# Patient Record
Sex: MALE | Race: WHITE | NOT HISPANIC OR LATINO
[De-identification: names, ages, dates, MRNs, and addresses within clinical notes are randomized per-mention and may not be internally consistent; named-entity substitution may affect disease eponyms.]

---

## 2023-02-06 PROBLEM — Z00.00 ENCOUNTER FOR PREVENTIVE HEALTH EXAMINATION: Status: ACTIVE | Noted: 2023-02-06

## 2023-02-07 ENCOUNTER — APPOINTMENT (OUTPATIENT)
Dept: ORTHOPEDIC SURGERY | Facility: CLINIC | Age: 68
End: 2023-02-07
Payer: MEDICARE

## 2023-02-07 VITALS — BODY MASS INDEX: 31.42 KG/M2 | WEIGHT: 232 LBS | HEIGHT: 72 IN

## 2023-02-07 DIAGNOSIS — Z87.442 PERSONAL HISTORY OF URINARY CALCULI: ICD-10-CM

## 2023-02-07 DIAGNOSIS — Z78.9 OTHER SPECIFIED HEALTH STATUS: ICD-10-CM

## 2023-02-07 PROCEDURE — 73080 X-RAY EXAM OF ELBOW: CPT | Mod: RT

## 2023-02-07 PROCEDURE — 99204 OFFICE O/P NEW MOD 45 MIN: CPT

## 2023-02-07 NOTE — ASSESSMENT
[FreeTextEntry1] : Pt has had right olecranon bursa aspirated 3 times and has again noted recurrence.\par Therefore, recommend surgical excision of right olecranon bursa and excision olecranon enthesophyte\par \par Risks including but not limited to infection, blood loss, tendon damage and delayed tendon disruption, muscle damage, nerve damage, stiffness, pain, arthritis, loss of function, potential for secondary surgery, loss of limb or life. The patient had the opportunity to ask questions and all questions were answered to their satisfaction.\par \par

## 2023-02-07 NOTE — HISTORY OF PRESENT ILLNESS
[de-identified] : 2/7/2023: Pt here with right elbow swelling. He has previously been diagnosed with gout s/p aspiration by Rheumatologist.\par Pt was provided allopurinol s/p 3rd aspiration.\par Pt was recommended Orthopedic consultation.\par \par PMH: Gout, htn, hyperlipidemia.\par Allergies: NKDA.

## 2023-02-07 NOTE — IMAGING
[de-identified] : Right elbow with full and pain free ROM.\par Strength is 5/5 in all planes.\par There is no ligamentous laxity noted.\par Small olecranon effusion is noted.\par There is no erythema noted.\par There is no ttp appreciated.,\par  [Right] : right elbow [FreeTextEntry1] : right olecranon enthesophyte

## 2023-02-10 ENCOUNTER — APPOINTMENT (OUTPATIENT)
Age: 68
End: 2023-02-10
Payer: MEDICARE

## 2023-02-10 ENCOUNTER — RESULT REVIEW (OUTPATIENT)
Age: 68
End: 2023-02-10

## 2023-02-10 PROCEDURE — 24341 RPR TDN/MUSC UPR A/E EACH: CPT | Mod: RT

## 2023-02-10 PROCEDURE — 24105 EXCISION OLECRANON BURSA: CPT | Mod: RT

## 2023-02-10 RX ORDER — ACETAMINOPHEN AND CODEINE 300; 30 MG/1; MG/1
300-30 TABLET ORAL 4 TIMES DAILY
Qty: 12 | Refills: 0 | Status: ACTIVE | COMMUNITY
Start: 2023-02-10 | End: 1900-01-01

## 2023-02-21 ENCOUNTER — APPOINTMENT (OUTPATIENT)
Dept: ORTHOPEDIC SURGERY | Facility: CLINIC | Age: 68
End: 2023-02-21
Payer: MEDICARE

## 2023-02-21 VITALS — HEIGHT: 72 IN | WEIGHT: 232 LBS | BODY MASS INDEX: 31.42 KG/M2

## 2023-02-21 PROCEDURE — 99024 POSTOP FOLLOW-UP VISIT: CPT

## 2023-02-21 NOTE — IMAGING
[de-identified] : wound clean dry and intact\par no erythema\par no drainage\par mild stiffness\par NV unchanged\par sutures removed\par

## 2023-02-21 NOTE — HISTORY OF PRESENT ILLNESS
[1] : 2 [0] : 0 [de-identified] : DOS: 2/10/23- RIGHT OLECRANON BURSECTOMY AND EXOSECTOMY\par \par 2/21/23:  Pt is doing well\par \par 2/7/2023: Pt here with right elbow swelling. He has previously been diagnosed with gout s/p aspiration by Rheumatologist.\par Pt was provided allopurinol s/p 3rd aspiration.\par Pt was recommended Orthopedic consultation.\par \par PMH: Gout, htn, hyperlipidemia.\par Allergies: NKDA.  [] : no [FreeTextEntry1] : right elbow [de-identified] : 2/10/23

## 2024-03-25 ENCOUNTER — APPOINTMENT (OUTPATIENT)
Dept: ORTHOPEDIC SURGERY | Facility: CLINIC | Age: 69
End: 2024-03-25
Payer: MEDICARE

## 2024-03-25 VITALS — BODY MASS INDEX: 31.15 KG/M2 | WEIGHT: 230 LBS | HEIGHT: 72 IN

## 2024-03-25 DIAGNOSIS — M70.21 OLECRANON BURSITIS, RIGHT ELBOW: ICD-10-CM

## 2024-03-25 PROCEDURE — 99214 OFFICE O/P EST MOD 30 MIN: CPT

## 2024-03-25 PROCEDURE — 73140 X-RAY EXAM OF FINGER(S): CPT | Mod: RT

## 2024-03-25 PROCEDURE — 73080 X-RAY EXAM OF ELBOW: CPT | Mod: RT

## 2024-03-25 PROCEDURE — 72100 X-RAY EXAM L-S SPINE 2/3 VWS: CPT

## 2024-03-25 NOTE — ASSESSMENT
[FreeTextEntry1] : The patient was advised of the diagnosis. The natural history of the pathology was explained in full to the patient in layman's terms. All questions were answered. The risks and benefits of surgical and non-surgical treatment alternatives were explained in full to the patient.  NSAIDs recommended.  Patient warned of risk of NSAID medication to stomach and GI tract, risk of increase blood pressure, cardiac risk, and risk of fluid retention.  The patient should clear taking medication with internist/PMD if any problem with heart, blood pressure, or GI system exists.  compression sleeve MRI L spine F/u with Dr Diehl
Patient/Caregiver provided printed discharge information.

## 2024-03-25 NOTE — IMAGING
[de-identified] : right elbow: swelling ttp over olecranon farom nvid  left index finger: mass over DIP farom nvid  xrays 3 views left finger show degenerative changes xrays 2 views L spine show degenerative changes

## 2024-03-25 NOTE — HISTORY OF PRESENT ILLNESS
[1] : 2 [0] : 0 [de-identified] : DOS: 2/10/23- RIGHT OLECRANON BURSECTOMY AND EXOSECTOMY  3/25/24:  Pt reports that the pain and swelling in his right elbow recurred in the past week or 2.  Pt has a mass on his left index finger.  Pt is having lower back pain.  2/21/23:  Pt is doing well  2/7/2023: Pt here with right elbow swelling. He has previously been diagnosed with gout s/p aspiration by Rheumatologist. Pt was provided allopurinol s/p 3rd aspiration. Pt was recommended Orthopedic consultation.  PMH: Gout, htn, hyperlipidemia. Allergies: NKDA.   03/25/2024: patient is complaining of RT elbow discomfort.  [] : no [FreeTextEntry1] : right elbow [de-identified] : 2/10/23

## 2024-03-26 ENCOUNTER — APPOINTMENT (OUTPATIENT)
Dept: MRI IMAGING | Facility: CLINIC | Age: 69
End: 2024-03-26
Payer: MEDICARE

## 2024-03-26 PROCEDURE — 72148 MRI LUMBAR SPINE W/O DYE: CPT | Mod: MH

## 2024-04-03 ENCOUNTER — APPOINTMENT (OUTPATIENT)
Dept: ORTHOPEDIC SURGERY | Facility: CLINIC | Age: 69
End: 2024-04-03
Payer: MEDICARE

## 2024-04-03 VITALS — HEIGHT: 72 IN | WEIGHT: 230 LBS | BODY MASS INDEX: 31.15 KG/M2

## 2024-04-03 DIAGNOSIS — Z87.74 PERSONAL HISTORY OF (CORRECTED) CONGENITAL MALFORMATIONS OF HEART AND CIRCULATORY SYSTEM: ICD-10-CM

## 2024-04-03 PROCEDURE — L0642: CPT | Mod: KV,KX,CG

## 2024-04-03 PROCEDURE — 99215 OFFICE O/P EST HI 40 MIN: CPT

## 2024-04-03 RX ORDER — FEXOFENADINE HCL 60 MG
CAPSULE ORAL
Refills: 0 | Status: ACTIVE | COMMUNITY

## 2024-04-03 RX ORDER — ALLOPURINOL 200 MG/1
TABLET ORAL
Refills: 0 | Status: ACTIVE | COMMUNITY

## 2024-04-03 RX ORDER — ATORVASTATIN CALCIUM 80 MG/1
TABLET, FILM COATED ORAL
Refills: 0 | Status: ACTIVE | COMMUNITY

## 2024-04-03 RX ORDER — MULTIVIT-MIN/FOLIC ACID/LUTEIN 400-250MCG
TABLET,CHEWABLE ORAL
Refills: 0 | Status: ACTIVE | COMMUNITY

## 2024-04-03 RX ORDER — ASPIRIN/ACETAMINOPHEN/CAFFEINE 500-325-65
POWDER IN PACKET (EA) ORAL
Refills: 0 | Status: ACTIVE | COMMUNITY

## 2024-04-03 RX ORDER — LACTOBACILLUS ACIDOPHILUS/PECT 30 MG-20MG
TABLET ORAL
Refills: 0 | Status: ACTIVE | COMMUNITY

## 2024-04-03 RX ORDER — METOPROLOL TARTRATE 75 MG/1
TABLET, FILM COATED ORAL
Refills: 0 | Status: ACTIVE | COMMUNITY

## 2024-04-03 RX ORDER — SENNOSIDES 8.6 MG
TABLET ORAL
Refills: 0 | Status: ACTIVE | COMMUNITY

## 2024-04-05 NOTE — DISCUSSION/SUMMARY
[Medication Risks Reviewed] : Medication risks reviewed [Surgical risks reviewed] : Surgical risks reviewed [de-identified] : REVIEWED THE CASE AND THE IMAGING  severe stenosi L2-5 with spondylolisthesis and facet arthropathy  DISCUSSION OF TX OPTOINS in detail   lesi WOULD BE A GOOD OPTION  AT LEAST l2-5 LAMINECTOMY +/- FUSION   Indicated the patient for laminecotomy and/or fusion L2-5  We've discussed the surgery details including instrumentation and grafting options (local, allograft, ICBG, and biologics) as well as potential for complications including but not limited to pain, scar and infection. There is also a possibility for hardware complication such as malposition of hardware,hardware loosening, pullout, failure or fracture of bone, adjacent segment disease,pseudarthrosis, and need for future surgery. We discussed potential for injury to nerves, spinal cord or blood vessels, paralysis, blindness, need for transfusion, general anesthesia, allergic reaction, prolonged intubation,myocardial infarction, stroke, deep venous thrombosis, pulmonary embolus, and death.  Spinal fluid leak may occur and may require prolonged time in the hospital and also further surgical procedures including drain placement.  The patient understands these things and all questions are answered to his/her satisfaction. Patient has been instructed to stop all Aspiri nand NSAIDs 10 days prior to surgery date. For Coumadin and other blood thinners, the patient is referred to the medical doctor  The patient will need a medical clearance and pre-admission testing prior to surgery We will use neuromonitoring in order to keep things as safe as possilble. The procedure does not come with a guarantee of success or of satisfaction on the patient's behalf  At the surgeon's discretion he may call for assistance during the surgery or in the perioperative period   questions answered - well informed and would like procced  LSO for post op

## 2024-04-05 NOTE — HISTORY OF PRESENT ILLNESS
[Lower back] : lower back [8] : 8 [0] : 0 [Dull/Aching] : dull/aching [Meds] : meds [] : yes [Standing] : standing [Walking] : walking [Lying in bed] : lying in bed [de-identified] : 4/3/24:  67 YO m HERE AS nc FROM dr BARBER - BACK NOT TOO BAD - LEGS BAD - CAN ONLY WALK HALF A BLOCK - activity very limited   no injection No surgery on the spine  No PT/CHIRO/ACCUPUNCTURE NO   hld/HTN KIDNEY STONES YEARS AGO  NO HX OF CANCER nO LOSS OF BB CONTROL   mrI L SPINE 3/26/24 - severe stenosis noted - [facility] report   XRAYS REVIEWED: l SPINE - LOSS OF DISC HEIGHT, spondylolisthesis L4-5, severe facet arthropathy   RETIRED   [FreeTextEntry5] : MRI ordered by Nic. lower back pain starting 4 weeks ago. [FreeTextEntry7] : both legs [de-identified] : xr and mri

## 2024-04-11 RX ORDER — ACETAMINOPHEN AND CODEINE PHOSPHATE 300; 30 MG/1; MG/1
300-30 TABLET ORAL EVERY 6 HOURS
Qty: 12 | Refills: 0 | Status: ACTIVE | COMMUNITY
Start: 2024-04-11 | End: 1900-01-01

## 2024-04-12 ENCOUNTER — APPOINTMENT (OUTPATIENT)
Age: 69
End: 2024-04-12
Payer: MEDICARE

## 2024-04-12 PROCEDURE — 26115 EXC HAND LES SC < 1.5 CM: CPT | Mod: F1

## 2024-04-19 ENCOUNTER — NON-APPOINTMENT (OUTPATIENT)
Age: 69
End: 2024-04-19

## 2024-04-19 ENCOUNTER — APPOINTMENT (OUTPATIENT)
Dept: PAIN MANAGEMENT | Facility: CLINIC | Age: 69
End: 2024-04-19
Payer: MEDICARE

## 2024-04-19 VITALS — BODY MASS INDEX: 31.15 KG/M2 | HEIGHT: 72 IN | WEIGHT: 230 LBS

## 2024-04-19 PROCEDURE — 99214 OFFICE O/P EST MOD 30 MIN: CPT

## 2024-04-19 RX ORDER — GABAPENTIN 300 MG/1
300 CAPSULE ORAL
Qty: 30 | Refills: 0 | Status: ACTIVE | COMMUNITY
Start: 2024-04-19 | End: 1900-01-01

## 2024-04-19 RX ORDER — METHYLPREDNISOLONE 4 MG/1
4 TABLET ORAL
Qty: 1 | Refills: 0 | Status: ACTIVE | COMMUNITY
Start: 2024-04-19 | End: 1900-01-01

## 2024-04-22 ENCOUNTER — APPOINTMENT (OUTPATIENT)
Dept: ORTHOPEDIC SURGERY | Facility: CLINIC | Age: 69
End: 2024-04-22
Payer: MEDICARE

## 2024-04-22 VITALS — HEIGHT: 72 IN | WEIGHT: 230 LBS | BODY MASS INDEX: 31.15 KG/M2

## 2024-04-22 PROCEDURE — 99024 POSTOP FOLLOW-UP VISIT: CPT

## 2024-04-22 NOTE — HISTORY OF PRESENT ILLNESS
[1] : 2 [0] : 0 [de-identified] : DOS: 2/10/23- RIGHT OLECRANON BURSECTOMY AND EXOSECTOMY  3/25/24:  Pt reports that the pain and swelling in his right elbow recurred in the past week or 2.  Pt has a mass on his left index finger.  Pt is having lower back pain.  2/21/23:  Pt is doing well  2/7/2023: Pt here with right elbow swelling. He has previously been diagnosed with gout s/p aspiration by Rheumatologist. Pt was provided allopurinol s/p 3rd aspiration. Pt was recommended Orthopedic consultation.  PMH: Gout, htn, hyperlipidemia. Allergies: NKDA.   03/25/2024: patient is complaining of RT elbow discomfort.  [] : no [FreeTextEntry1] : right elbow [FreeTextEntry5] : 4/22 mild postop pain [de-identified] : 2/10/23

## 2024-04-22 NOTE — IMAGING
[de-identified] : right elbow: swelling ttp over olecranon farom nvid  left index finger: mass over DIP farom nvid  xrays 3 views left finger show degenerative changes xrays 2 views L spine show degenerative changes

## 2024-05-01 ENCOUNTER — OUTPATIENT (OUTPATIENT)
Dept: OUTPATIENT SERVICES | Facility: HOSPITAL | Age: 69
LOS: 1 days | Discharge: HOME | End: 2024-05-01
Payer: MEDICARE

## 2024-05-01 VITALS
TEMPERATURE: 97 F | HEART RATE: 86 BPM | WEIGHT: 238.32 LBS | DIASTOLIC BLOOD PRESSURE: 66 MMHG | RESPIRATION RATE: 18 BRPM | SYSTOLIC BLOOD PRESSURE: 102 MMHG | OXYGEN SATURATION: 96 %

## 2024-05-01 DIAGNOSIS — Z98.890 OTHER SPECIFIED POSTPROCEDURAL STATES: Chronic | ICD-10-CM

## 2024-05-01 DIAGNOSIS — M48.062 SPINAL STENOSIS, LUMBAR REGION WITH NEUROGENIC CLAUDICATION: ICD-10-CM

## 2024-05-01 DIAGNOSIS — I10 ESSENTIAL (PRIMARY) HYPERTENSION: ICD-10-CM

## 2024-05-01 DIAGNOSIS — Z01.818 ENCOUNTER FOR OTHER PREPROCEDURAL EXAMINATION: ICD-10-CM

## 2024-05-01 LAB
A1C WITH ESTIMATED AVERAGE GLUCOSE RESULT: 5.6 % — SIGNIFICANT CHANGE UP (ref 4–5.6)
ALBUMIN SERPL ELPH-MCNC: 3.5 G/DL — SIGNIFICANT CHANGE UP (ref 3.3–5)
ALP SERPL-CCNC: 134 U/L — HIGH (ref 40–120)
ALT FLD-CCNC: 26 U/L — SIGNIFICANT CHANGE UP (ref 12–78)
ANION GAP SERPL CALC-SCNC: 5 MMOL/L — SIGNIFICANT CHANGE UP (ref 5–17)
APTT BLD: 28.6 SEC — SIGNIFICANT CHANGE UP (ref 24.5–35.6)
AST SERPL-CCNC: 21 U/L — SIGNIFICANT CHANGE UP (ref 15–37)
BASOPHILS # BLD AUTO: 0.06 K/UL — SIGNIFICANT CHANGE UP (ref 0–0.2)
BASOPHILS NFR BLD AUTO: 0.5 % — SIGNIFICANT CHANGE UP (ref 0–2)
BILIRUB SERPL-MCNC: 0.8 MG/DL — SIGNIFICANT CHANGE UP (ref 0.2–1.2)
BLD GP AB SCN SERPL QL: SIGNIFICANT CHANGE UP
BUN SERPL-MCNC: 20 MG/DL — SIGNIFICANT CHANGE UP (ref 7–23)
CALCIUM SERPL-MCNC: 9.3 MG/DL — SIGNIFICANT CHANGE UP (ref 8.5–10.1)
CHLORIDE SERPL-SCNC: 105 MMOL/L — SIGNIFICANT CHANGE UP (ref 96–108)
CO2 SERPL-SCNC: 27 MMOL/L — SIGNIFICANT CHANGE UP (ref 22–31)
CREAT SERPL-MCNC: 1.4 MG/DL — HIGH (ref 0.5–1.3)
EGFR: 55 ML/MIN/1.73M2 — LOW
EOSINOPHIL # BLD AUTO: 0.51 K/UL — HIGH (ref 0–0.5)
EOSINOPHIL NFR BLD AUTO: 3.9 % — SIGNIFICANT CHANGE UP (ref 0–6)
ESTIMATED AVERAGE GLUCOSE: 114 MG/DL — SIGNIFICANT CHANGE UP (ref 68–114)
GLUCOSE SERPL-MCNC: 100 MG/DL — HIGH (ref 70–99)
HCT VFR BLD CALC: 45.3 % — SIGNIFICANT CHANGE UP (ref 39–50)
HGB BLD-MCNC: 16 G/DL — SIGNIFICANT CHANGE UP (ref 13–17)
IMM GRANULOCYTES NFR BLD AUTO: 0.4 % — SIGNIFICANT CHANGE UP (ref 0–0.9)
INR BLD: 0.87 RATIO — SIGNIFICANT CHANGE UP (ref 0.85–1.18)
LYMPHOCYTES # BLD AUTO: 1.28 K/UL — SIGNIFICANT CHANGE UP (ref 1–3.3)
LYMPHOCYTES # BLD AUTO: 9.8 % — LOW (ref 13–44)
MCHC RBC-ENTMCNC: 31.4 PG — SIGNIFICANT CHANGE UP (ref 27–34)
MCHC RBC-ENTMCNC: 35.3 G/DL — SIGNIFICANT CHANGE UP (ref 32–36)
MCV RBC AUTO: 88.8 FL — SIGNIFICANT CHANGE UP (ref 80–100)
MONOCYTES # BLD AUTO: 1.48 K/UL — HIGH (ref 0–0.9)
MONOCYTES NFR BLD AUTO: 11.3 % — SIGNIFICANT CHANGE UP (ref 2–14)
MRSA PCR RESULT.: SIGNIFICANT CHANGE UP
NEUTROPHILS # BLD AUTO: 9.69 K/UL — HIGH (ref 1.8–7.4)
NEUTROPHILS NFR BLD AUTO: 74.1 % — SIGNIFICANT CHANGE UP (ref 43–77)
NRBC # BLD: 0 /100 WBCS — SIGNIFICANT CHANGE UP (ref 0–0)
PLATELET # BLD AUTO: 211 K/UL — SIGNIFICANT CHANGE UP (ref 150–400)
POTASSIUM SERPL-MCNC: 4.4 MMOL/L — SIGNIFICANT CHANGE UP (ref 3.5–5.3)
POTASSIUM SERPL-SCNC: 4.4 MMOL/L — SIGNIFICANT CHANGE UP (ref 3.5–5.3)
PROT SERPL-MCNC: 7.4 GM/DL — SIGNIFICANT CHANGE UP (ref 6–8.3)
PROTHROM AB SERPL-ACNC: 10.5 SEC — SIGNIFICANT CHANGE UP (ref 9.5–13)
RBC # BLD: 5.1 M/UL — SIGNIFICANT CHANGE UP (ref 4.2–5.8)
RBC # FLD: 12.2 % — SIGNIFICANT CHANGE UP (ref 10.3–14.5)
S AUREUS DNA NOSE QL NAA+PROBE: DETECTED
SODIUM SERPL-SCNC: 137 MMOL/L — SIGNIFICANT CHANGE UP (ref 135–145)
WBC # BLD: 13.07 K/UL — HIGH (ref 3.8–10.5)
WBC # FLD AUTO: 13.07 K/UL — HIGH (ref 3.8–10.5)

## 2024-05-01 PROCEDURE — 93010 ELECTROCARDIOGRAM REPORT: CPT

## 2024-05-01 RX ORDER — SODIUM CHLORIDE 9 MG/ML
3 INJECTION INTRAMUSCULAR; INTRAVENOUS; SUBCUTANEOUS EVERY 8 HOURS
Refills: 0 | Status: DISCONTINUED | OUTPATIENT
Start: 2024-05-21 | End: 2024-05-26

## 2024-05-01 NOTE — H&P PST ADULT - ASSESSMENT
68M htn, hld, gout here for PST for scheduled Decompression laminectomy fusion with Dr. Diehl on 2024  CAPRINI SCORE    AGE RELATED RISK FACTORS                                                             [ ] Age 41-60 years                                            (1 Point)  [x ] Age: 61-74 years                                           (2 Points)                 [ ] Age= 75 years                                                (3 Points)             DISEASE RELATED RISK FACTORS                                                       [ ] Edema in the lower extremities                 (1 Point)                     [ ] Varicose veins                                               (1 Point)                                 [ x] BMI > 25 Kg/m2                                            (1 Point)                                  [ ] Serious infection (ie PNA)                            (1 Point)                     [ ] Lung disease ( COPD, Emphysema)            (1 Point)                                                                          [ ] Acute myocardial infarction                         (1 Point)                  [ ] Congestive heart failure (in the previous month)  (1 Point)         [ ] Inflammatory bowel disease                            (1 Point)                  [ ] Central venous access, PICC or Port               (2 points)       (within the last month)                                                                [ ] Stroke (in the previous month)                        (5 Points)    [ ] Previous or present malignancy                       (2 points)                                                                                                                                                         HEMATOLOGY RELATED FACTORS                                                         [ ] Prior episodes of VTE                                     (3 Points)                     [ ] Positive family history for VTE                      (3 Points)                  [ ] Prothrombin 06361 A                                     (3 Points)                     [ ] Factor V Leiden                                                (3 Points)                        [ ] Lupus anticoagulants                                      (3 Points)                                                           [ ] Anticardiolipin antibodies                              (3 Points)                                                       [ ] High homocysteine in the blood                   (3 Points)                                             [ ] Other congenital or acquired thrombophilia      (3 Points)                                                [ ] Heparin induced thrombocytopenia                  (3 Points)                                        MOBILITY RELATED FACTORS  [ ] Bed rest                                                         (1 Point)  [ ] Plaster cast                                                    (2 points)  [ ] Bed bound for more than 72 hours           (2 Points)    GENDER SPECIFIC FACTORS  [ ] Pregnancy or had a baby within the last month   (1 Point)  [ ] Post-partum < 6 weeks                                   (1 Point)  [ ] Hormonal therapy  or oral contraception   (1 Point)  [ ] History of pregnancy complications              (1 point)  [ ] Unexplained or recurrent              (1 Point)    OTHER RISK FACTORS                                           (1 Point)  [ ] BMI >40, smoking, diabetes requiring insulin, chemotherapy  blood transfusions and length of surgery over 2 hours    SURGERY RELATED RISK FACTORS  [ ]  Section within the last month     (1 Point)  [ ] Minor surgery                                                  (1 Point)  [ ] Arthroscopic surgery                                       (2 Points)  [ x] Planned major surgery lasting more            (2 Points)      than 45 minutes     [ ] Elective hip or knee joint replacement       (5 points)       surgery                                                TRAUMA RELATED RISK FACTORS  [ ] Fracture of the hip, pelvis, or leg                       (5 Points)  [ ] Spinal cord injury resulting in paralysis             (5 points)       (in the previous month)    [ ] Paralysis  (less than 1 month)                             (5 Points)  [ ] Multiple Trauma within 1 month                        (5 Points)    Total Score [    5    ]    Caprini Score 0-2: Low Risk, NO VTE prophylaxis required for most patients, encourage ambulation  Caprini Score 3-6: Moderate Risk , pharmacologic VTE prophylaxis is indicated for most patients (in the absence of contraindications)  Caprini Score Greater than or =7: High risk, pharmocologic VTE prophylaxis indicated for most patients (in the absence of contraindications)

## 2024-05-01 NOTE — H&P PST ADULT - NSICDXPASTMEDICALHX_GEN_ALL_CORE_FT
PAST MEDICAL HISTORY:  Gout     H/O seasonal allergies     HLD (hyperlipidemia)     HTN (hypertension)

## 2024-05-01 NOTE — H&P PST ADULT - PROBLEM SELECTOR PLAN 1
decompression laminectomy fusion   labs - cbc,pt/ptt,bmp,t&s,nose cx,ekg  M/C required  preop 3 day hibiclens instruction reviewed and given .instructed on if  nose cx positive use mupuricin 5 days and checklist given  take routine meds DOS with sips of water. avoid NSAID and OTC supplements. verbalized understanding  information on proper nutrition , increase protein and better food choices provided in packet   Anesthesiologist to review PST labs, EKG, required clearances and optimization for surgery.

## 2024-05-01 NOTE — H&P PST ADULT - ATTENDING COMMENTS
severe stenosis   indicated for lami/fusion   r/b/e of the procedure discussed and questions answered

## 2024-05-02 ENCOUNTER — TRANSCRIPTION ENCOUNTER (OUTPATIENT)
Age: 69
End: 2024-05-02

## 2024-05-02 ENCOUNTER — APPOINTMENT (OUTPATIENT)
Dept: ORTHOPEDIC SURGERY | Facility: CLINIC | Age: 69
End: 2024-05-02
Payer: MEDICARE

## 2024-05-02 DIAGNOSIS — R22.32 LOCALIZED SWELLING, MASS AND LUMP, LEFT UPPER LIMB: ICD-10-CM

## 2024-05-02 LAB — VIT D25+D1,25 OH+D1,25 PNL SERPL-MCNC: 28.7 PG/ML — SIGNIFICANT CHANGE UP (ref 19.9–79.3)

## 2024-05-02 PROCEDURE — 99024 POSTOP FOLLOW-UP VISIT: CPT

## 2024-05-02 RX ORDER — MUPIROCIN 20 MG/G
1 OINTMENT TOPICAL
Qty: 1 | Refills: 0
Start: 2024-05-02 | End: 2024-05-06

## 2024-05-02 NOTE — HISTORY OF PRESENT ILLNESS
[1] : 2 [0] : 0 [de-identified] : DOS 4/12/24: s/p L IF mucous cyst excision  DOS: 2/10/23- RIGHT OLECRANON BURSECTOMY AND EXOSECTOMY  3/25/24:  Pt reports that the pain and swelling in his right elbow recurred in the past week or 2.  Pt has a mass on his left index finger.  Pt is having lower back pain.  2/21/23:  Pt is doing well  2/7/2023: Pt here with right elbow swelling. He has previously been diagnosed with gout s/p aspiration by Rheumatologist. Pt was provided allopurinol s/p 3rd aspiration. Pt was recommended Orthopedic consultation.  PMH: Gout, htn, hyperlipidemia. Allergies: NKDA.   03/25/2024: patient is complaining of RT elbow discomfort.   05/02/2024: Patient is here for PO#1 of left index finger. Patient is improving, notes some burning pain occasionally.  [] : no [FreeTextEntry1] : right elbow [de-identified] : 2/10/23

## 2024-05-06 ENCOUNTER — APPOINTMENT (OUTPATIENT)
Dept: ORTHOPEDIC SURGERY | Facility: CLINIC | Age: 69
End: 2024-05-06
Payer: MEDICARE

## 2024-05-06 ENCOUNTER — NON-APPOINTMENT (OUTPATIENT)
Age: 69
End: 2024-05-06

## 2024-05-06 VITALS — WEIGHT: 230 LBS | BODY MASS INDEX: 31.15 KG/M2 | HEIGHT: 72 IN

## 2024-05-06 PROCEDURE — 99214 OFFICE O/P EST MOD 30 MIN: CPT

## 2024-05-07 NOTE — DISCUSSION/SUMMARY
[Medication Risks Reviewed] : Medication risks reviewed [Surgical risks reviewed] : Surgical risks reviewed [de-identified] : REVIEWED THE CASE AND THE IMAGING  severe stenosi L2-5 with spondylolisthesis and facet arthropathy  DISCUSSION OF TX OPTOINS in detail   lesi WOULD BE A GOOD OPTION  AT LEAST l2-5 LAMINECTOMY +/- FUSION   Indicated the patient for laminecotomy and/or fusion L2-5  We've discussed the surgery details including instrumentation and grafting options (local, allograft, ICBG, and biologics) as well as potential for complications including but not limited to pain, scar and infection. There is also a possibility for hardware complication such as malposition of hardware,hardware loosening, pullout, failure or fracture of bone, adjacent segment disease,pseudarthrosis, and need for future surgery. We discussed potential for injury to nerves, spinal cord or blood vessels, paralysis, blindness, need for transfusion, general anesthesia, allergic reaction, prolonged intubation,myocardial infarction, stroke, deep venous thrombosis, pulmonary embolus, and death.  Spinal fluid leak may occur and may require prolonged time in the hospital and also further surgical procedures including drain placement.  The patient understands these things and all questions are answered to his/her satisfaction. Patient has been instructed to stop all Aspiri nand NSAIDs 10 days prior to surgery date. For Coumadin and other blood thinners, the patient is referred to the medical doctor  The patient will need a medical clearance and pre-admission testing prior to surgery We will use neuromonitoring in order to keep things as safe as possilble. The procedure does not come with a guarantee of success or of satisfaction on the patient's behalf  At the surgeon's discretion he may call for assistance during the surgery or in the perioperative period   questions answered - well informed and would like procced  LSO for post op   long discussion today including the limitations of surgery  do not know how much residual back pain hell have and how much sporting activity hell be able to engage in   he would like to proceed with surgery

## 2024-05-07 NOTE — HISTORY OF PRESENT ILLNESS
[Lower back] : lower back [8] : 8 [0] : 0 [Dull/Aching] : dull/aching [Meds] : meds [Standing] : standing [Walking] : walking [Lying in bed] : lying in bed [] : yes [de-identified] : 4/3/24:  67 YO m HERE AS nc FROM dr BARBER - BACK NOT TOO BAD - LEGS BAD - CAN ONLY WALK HALF A BLOCK - activity very limited   no injection No surgery on the spine  No PT/CHIRO/ACCUPUNCTURE NO   hld/HTN KIDNEY STONES YEARS AGO  NO HX OF CANCER nO LOSS OF BB CONTROL   mrI L SPINE 3/26/24 - severe stenosis noted - [facility] report   XRAYS REVIEWED: l SPINE - LOSS OF DISC HEIGHT, spondylolisthesis L4-5, severe facet arthropathy   RETIRED    5/6/24: Here to follow up back and leg pain. Seen by Pain managment/Dr Neri with MDP and teresita 300mg QHS with some improvment but still pain. Severe leg pain worse with walking any distance. Pain in back of both thighs. Improves with rest. Cant really walk far activities quite limited  [FreeTextEntry5] : MRI ordered by Nic. lower back pain starting 4 weeks ago. [FreeTextEntry7] : both legs [de-identified] : xr and mri

## 2024-05-15 ENCOUNTER — APPOINTMENT (OUTPATIENT)
Age: 69
End: 2024-05-15

## 2024-05-20 ENCOUNTER — TRANSCRIPTION ENCOUNTER (OUTPATIENT)
Age: 69
End: 2024-05-20

## 2024-05-21 ENCOUNTER — APPOINTMENT (OUTPATIENT)
Dept: ORTHOPEDIC SURGERY | Facility: HOSPITAL | Age: 69
End: 2024-05-21
Payer: MEDICARE

## 2024-05-21 ENCOUNTER — INPATIENT (INPATIENT)
Facility: HOSPITAL | Age: 69
LOS: 4 days | Discharge: HOME | End: 2024-05-26
Attending: ORTHOPAEDIC SURGERY | Admitting: ORTHOPAEDIC SURGERY

## 2024-05-21 ENCOUNTER — TRANSCRIPTION ENCOUNTER (OUTPATIENT)
Age: 69
End: 2024-05-21

## 2024-05-21 VITALS
DIASTOLIC BLOOD PRESSURE: 79 MMHG | TEMPERATURE: 98 F | SYSTOLIC BLOOD PRESSURE: 136 MMHG | HEART RATE: 78 BPM | HEIGHT: 72 IN | OXYGEN SATURATION: 98 % | WEIGHT: 238.1 LBS | RESPIRATION RATE: 12 BRPM

## 2024-05-21 DIAGNOSIS — Z98.890 OTHER SPECIFIED POSTPROCEDURAL STATES: Chronic | ICD-10-CM

## 2024-05-21 LAB
ANION GAP SERPL CALC-SCNC: 6 MMOL/L — SIGNIFICANT CHANGE UP (ref 5–17)
BASOPHILS # BLD AUTO: 0.02 K/UL — SIGNIFICANT CHANGE UP (ref 0–0.2)
BASOPHILS NFR BLD AUTO: 0.1 % — SIGNIFICANT CHANGE UP (ref 0–2)
BLD GP AB SCN SERPL QL: SIGNIFICANT CHANGE UP
BUN SERPL-MCNC: 16 MG/DL — SIGNIFICANT CHANGE UP (ref 7–23)
CALCIUM SERPL-MCNC: 6.7 MG/DL — LOW (ref 8.5–10.1)
CHLORIDE SERPL-SCNC: 119 MMOL/L — HIGH (ref 96–108)
CO2 SERPL-SCNC: 19 MMOL/L — LOW (ref 22–31)
CREAT SERPL-MCNC: 0.91 MG/DL — SIGNIFICANT CHANGE UP (ref 0.5–1.3)
EGFR: 92 ML/MIN/1.73M2 — SIGNIFICANT CHANGE UP
EOSINOPHIL # BLD AUTO: 0.02 K/UL — SIGNIFICANT CHANGE UP (ref 0–0.5)
EOSINOPHIL NFR BLD AUTO: 0.1 % — SIGNIFICANT CHANGE UP (ref 0–6)
GLUCOSE BLDC GLUCOMTR-MCNC: 114 MG/DL — HIGH (ref 70–99)
GLUCOSE SERPL-MCNC: 110 MG/DL — HIGH (ref 70–99)
HCT VFR BLD CALC: 39.4 % — SIGNIFICANT CHANGE UP (ref 39–50)
HCT VFR BLD CALC: 43 % — SIGNIFICANT CHANGE UP (ref 39–50)
HGB BLD-MCNC: 13.8 G/DL — SIGNIFICANT CHANGE UP (ref 13–17)
HGB BLD-MCNC: 14.9 G/DL — SIGNIFICANT CHANGE UP (ref 13–17)
IMM GRANULOCYTES NFR BLD AUTO: 0.5 % — SIGNIFICANT CHANGE UP (ref 0–0.9)
LYMPHOCYTES # BLD AUTO: 1.31 K/UL — SIGNIFICANT CHANGE UP (ref 1–3.3)
LYMPHOCYTES # BLD AUTO: 9 % — LOW (ref 13–44)
MCHC RBC-ENTMCNC: 30.7 PG — SIGNIFICANT CHANGE UP (ref 27–34)
MCHC RBC-ENTMCNC: 31.8 PG — SIGNIFICANT CHANGE UP (ref 27–34)
MCHC RBC-ENTMCNC: 34.7 G/DL — SIGNIFICANT CHANGE UP (ref 32–36)
MCHC RBC-ENTMCNC: 35 G/DL — SIGNIFICANT CHANGE UP (ref 32–36)
MCV RBC AUTO: 88.7 FL — SIGNIFICANT CHANGE UP (ref 80–100)
MCV RBC AUTO: 90.8 FL — SIGNIFICANT CHANGE UP (ref 80–100)
MONOCYTES # BLD AUTO: 0.17 K/UL — SIGNIFICANT CHANGE UP (ref 0–0.9)
MONOCYTES NFR BLD AUTO: 1.2 % — LOW (ref 2–14)
NEUTROPHILS # BLD AUTO: 12.99 K/UL — HIGH (ref 1.8–7.4)
NEUTROPHILS NFR BLD AUTO: 89.1 % — HIGH (ref 43–77)
NRBC # BLD: 0 /100 WBCS — SIGNIFICANT CHANGE UP (ref 0–0)
NRBC # BLD: 0 /100 WBCS — SIGNIFICANT CHANGE UP (ref 0–0)
PLATELET # BLD AUTO: 268 K/UL — SIGNIFICANT CHANGE UP (ref 150–400)
PLATELET # BLD AUTO: 280 K/UL — SIGNIFICANT CHANGE UP (ref 150–400)
POTASSIUM SERPL-MCNC: 4 MMOL/L — SIGNIFICANT CHANGE UP (ref 3.5–5.3)
POTASSIUM SERPL-SCNC: 4 MMOL/L — SIGNIFICANT CHANGE UP (ref 3.5–5.3)
RBC # BLD: 4.34 M/UL — SIGNIFICANT CHANGE UP (ref 4.2–5.8)
RBC # BLD: 4.85 M/UL — SIGNIFICANT CHANGE UP (ref 4.2–5.8)
RBC # FLD: 12.2 % — SIGNIFICANT CHANGE UP (ref 10.3–14.5)
RBC # FLD: 12.4 % — SIGNIFICANT CHANGE UP (ref 10.3–14.5)
SODIUM SERPL-SCNC: 144 MMOL/L — SIGNIFICANT CHANGE UP (ref 135–145)
WBC # BLD: 14.59 K/UL — HIGH (ref 3.8–10.5)
WBC # BLD: 5.98 K/UL — SIGNIFICANT CHANGE UP (ref 3.8–10.5)
WBC # FLD AUTO: 14.59 K/UL — HIGH (ref 3.8–10.5)
WBC # FLD AUTO: 5.98 K/UL — SIGNIFICANT CHANGE UP (ref 3.8–10.5)

## 2024-05-21 PROCEDURE — 22614 ARTHRD PST TQ 1NTRSPC EA ADD: CPT

## 2024-05-21 PROCEDURE — 22216 INCIS ADDL SPINE SEGMENT: CPT | Mod: AS

## 2024-05-21 PROCEDURE — 22842 INSERT SPINE FIXATION DEVICE: CPT

## 2024-05-21 PROCEDURE — 22614 ARTHRD PST TQ 1NTRSPC EA ADD: CPT | Mod: AS

## 2024-05-21 PROCEDURE — 22612 ARTHRD PST TQ 1NTRSPC LUMBAR: CPT | Mod: AS

## 2024-05-21 PROCEDURE — 63048 LAM FACETEC &FORAMOT EA ADDL: CPT | Mod: AS

## 2024-05-21 PROCEDURE — 20930 SP BONE ALGRFT MORSEL ADD-ON: CPT

## 2024-05-21 PROCEDURE — 63048 LAM FACETEC &FORAMOT EA ADDL: CPT

## 2024-05-21 PROCEDURE — 63047 LAM FACETEC & FORAMOT LUMBAR: CPT | Mod: AS

## 2024-05-21 PROCEDURE — 22214 INCIS 1 VERTEBRAL SEG LUMBAR: CPT | Mod: AS

## 2024-05-21 PROCEDURE — 63047 LAM FACETEC & FORAMOT LUMBAR: CPT

## 2024-05-21 PROCEDURE — 64708 REVISE ARM/LEG NERVE: CPT | Mod: AS

## 2024-05-21 PROCEDURE — 20930 SP BONE ALGRFT MORSEL ADD-ON: CPT | Mod: AS

## 2024-05-21 PROCEDURE — 61783 SCAN PROC SPINAL: CPT

## 2024-05-21 PROCEDURE — 22216 INCIS ADDL SPINE SEGMENT: CPT

## 2024-05-21 PROCEDURE — 22842 INSERT SPINE FIXATION DEVICE: CPT | Mod: AS

## 2024-05-21 PROCEDURE — 22612 ARTHRD PST TQ 1NTRSPC LUMBAR: CPT

## 2024-05-21 PROCEDURE — 64708 REVISE ARM/LEG NERVE: CPT

## 2024-05-21 PROCEDURE — 22214 INCIS 1 VERTEBRAL SEG LUMBAR: CPT

## 2024-05-21 PROCEDURE — 61783 SCAN PROC SPINAL: CPT | Mod: AS

## 2024-05-21 DEVICE — GRAFT ATTRAX PUTTY 10CC: Type: IMPLANTABLE DEVICE | Site: BACK, POSTERIOR | Status: FUNCTIONAL

## 2024-05-21 DEVICE — IMPLANTABLE DEVICE: Type: IMPLANTABLE DEVICE | Site: BACK, POSTERIOR | Status: FUNCTIONAL

## 2024-05-21 DEVICE — CONN RELINE-O X-CONN 45-65 5.5MM: Type: IMPLANTABLE DEVICE | Site: BACK, POSTERIOR | Status: FUNCTIONAL

## 2024-05-21 DEVICE — SURGIFLO HEMOSTATIC MATRIX KIT: Type: IMPLANTABLE DEVICE | Site: BACK, POSTERIOR | Status: FUNCTIONAL

## 2024-05-21 DEVICE — SCREW RELINEO POLY 6.5X45MM: Type: IMPLANTABLE DEVICE | Site: BACK, POSTERIOR | Status: FUNCTIONAL

## 2024-05-21 DEVICE — GRAFT BONE INFUSE KIT LG: Type: IMPLANTABLE DEVICE | Site: BACK, POSTERIOR | Status: FUNCTIONAL

## 2024-05-21 DEVICE — SCREW RELINEO POLY 6.5X50MM: Type: IMPLANTABLE DEVICE | Site: BACK, POSTERIOR | Status: FUNCTIONAL

## 2024-05-21 DEVICE — SURGIFLO MATRIX WITH THROMBIN KIT: Type: IMPLANTABLE DEVICE | Site: BACK, POSTERIOR | Status: FUNCTIONAL

## 2024-05-21 DEVICE — SCREW LOCKG OPEN TULIP RELINE 5.5MM: Type: IMPLANTABLE DEVICE | Site: BACK, POSTERIOR | Status: FUNCTIONAL

## 2024-05-21 RX ORDER — ATORVASTATIN CALCIUM 80 MG/1
1 TABLET, FILM COATED ORAL
Refills: 0 | DISCHARGE

## 2024-05-21 RX ORDER — FEXOFENADINE HCL 30 MG
1 TABLET ORAL
Refills: 0 | DISCHARGE

## 2024-05-21 RX ORDER — CEFAZOLIN SODIUM 1 G
2000 VIAL (EA) INJECTION EVERY 8 HOURS
Refills: 0 | Status: DISCONTINUED | OUTPATIENT
Start: 2024-05-21 | End: 2024-05-26

## 2024-05-21 RX ORDER — ATORVASTATIN CALCIUM 80 MG/1
10 TABLET, FILM COATED ORAL AT BEDTIME
Refills: 0 | Status: DISCONTINUED | OUTPATIENT
Start: 2024-05-21 | End: 2024-05-26

## 2024-05-21 RX ORDER — ALLOPURINOL 300 MG
1 TABLET ORAL
Refills: 0 | DISCHARGE

## 2024-05-21 RX ORDER — HYDROMORPHONE HYDROCHLORIDE 2 MG/ML
1 INJECTION INTRAMUSCULAR; INTRAVENOUS; SUBCUTANEOUS
Refills: 0 | Status: DISCONTINUED | OUTPATIENT
Start: 2024-05-21 | End: 2024-05-21

## 2024-05-21 RX ORDER — ALLOPURINOL 300 MG
200 TABLET ORAL DAILY
Refills: 0 | Status: DISCONTINUED | OUTPATIENT
Start: 2024-05-21 | End: 2024-05-26

## 2024-05-21 RX ORDER — POLYETHYLENE GLYCOL 3350 17 G/17G
17 POWDER, FOR SOLUTION ORAL
Refills: 0 | Status: DISCONTINUED | OUTPATIENT
Start: 2024-05-21 | End: 2024-05-26

## 2024-05-21 RX ORDER — HYDROMORPHONE HYDROCHLORIDE 2 MG/ML
0.5 INJECTION INTRAMUSCULAR; INTRAVENOUS; SUBCUTANEOUS
Refills: 0 | Status: DISCONTINUED | OUTPATIENT
Start: 2024-05-21 | End: 2024-05-23

## 2024-05-21 RX ORDER — CYCLOBENZAPRINE HYDROCHLORIDE 10 MG/1
10 TABLET, FILM COATED ORAL EVERY 8 HOURS
Refills: 0 | Status: DISCONTINUED | OUTPATIENT
Start: 2024-05-21 | End: 2024-05-26

## 2024-05-21 RX ORDER — METOPROLOL TARTRATE 50 MG
50 TABLET ORAL DAILY
Refills: 0 | Status: DISCONTINUED | OUTPATIENT
Start: 2024-05-22 | End: 2024-05-26

## 2024-05-21 RX ORDER — OXYCODONE HYDROCHLORIDE 5 MG/1
10 TABLET ORAL EVERY 4 HOURS
Refills: 0 | Status: DISCONTINUED | OUTPATIENT
Start: 2024-05-21 | End: 2024-05-26

## 2024-05-21 RX ORDER — ACETAMINOPHEN 500 MG
975 TABLET ORAL EVERY 8 HOURS
Refills: 0 | Status: DISCONTINUED | OUTPATIENT
Start: 2024-05-21 | End: 2024-05-26

## 2024-05-21 RX ORDER — OXYCODONE HYDROCHLORIDE 5 MG/1
15 TABLET ORAL EVERY 4 HOURS
Refills: 0 | Status: DISCONTINUED | OUTPATIENT
Start: 2024-05-21 | End: 2024-05-26

## 2024-05-21 RX ORDER — SODIUM CHLORIDE 9 MG/ML
1000 INJECTION, SOLUTION INTRAVENOUS
Refills: 0 | Status: DISCONTINUED | OUTPATIENT
Start: 2024-05-21 | End: 2024-05-21

## 2024-05-21 RX ORDER — DIPHENHYDRAMINE HCL 50 MG
12.5 CAPSULE ORAL EVERY 6 HOURS
Refills: 0 | Status: DISCONTINUED | OUTPATIENT
Start: 2024-05-21 | End: 2024-05-26

## 2024-05-21 RX ORDER — METOPROLOL TARTRATE 50 MG
1 TABLET ORAL
Refills: 0 | DISCHARGE

## 2024-05-21 RX ORDER — SENNA PLUS 8.6 MG/1
2 TABLET ORAL AT BEDTIME
Refills: 0 | Status: DISCONTINUED | OUTPATIENT
Start: 2024-05-21 | End: 2024-05-26

## 2024-05-21 RX ORDER — ACETAMINOPHEN 500 MG
1000 TABLET ORAL ONCE
Refills: 0 | Status: DISCONTINUED | OUTPATIENT
Start: 2024-05-21 | End: 2024-05-21

## 2024-05-21 RX ORDER — LORATADINE 10 MG/1
10 TABLET ORAL DAILY
Refills: 0 | Status: DISCONTINUED | OUTPATIENT
Start: 2024-05-21 | End: 2024-05-26

## 2024-05-21 RX ORDER — SODIUM CHLORIDE 9 MG/ML
1000 INJECTION, SOLUTION INTRAVENOUS
Refills: 0 | Status: DISCONTINUED | OUTPATIENT
Start: 2024-05-21 | End: 2024-05-26

## 2024-05-21 RX ORDER — ONDANSETRON 8 MG/1
4 TABLET, FILM COATED ORAL EVERY 6 HOURS
Refills: 0 | Status: DISCONTINUED | OUTPATIENT
Start: 2024-05-21 | End: 2024-05-26

## 2024-05-21 RX ORDER — ASPIRIN/CALCIUM CARB/MAGNESIUM 324 MG
81 TABLET ORAL DAILY
Refills: 0 | Status: DISCONTINUED | OUTPATIENT
Start: 2024-05-23 | End: 2024-05-26

## 2024-05-21 RX ADMIN — ATORVASTATIN CALCIUM 10 MILLIGRAM(S): 80 TABLET, FILM COATED ORAL at 22:19

## 2024-05-21 RX ADMIN — SODIUM CHLORIDE 3 MILLILITER(S): 9 INJECTION INTRAMUSCULAR; INTRAVENOUS; SUBCUTANEOUS at 22:36

## 2024-05-21 RX ADMIN — Medication 975 MILLIGRAM(S): at 23:14

## 2024-05-21 RX ADMIN — SENNA PLUS 2 TABLET(S): 8.6 TABLET ORAL at 22:14

## 2024-05-21 RX ADMIN — Medication 100 MILLIGRAM(S): at 20:33

## 2024-05-21 RX ADMIN — OXYCODONE HYDROCHLORIDE 15 MILLIGRAM(S): 5 TABLET ORAL at 21:49

## 2024-05-21 RX ADMIN — Medication 975 MILLIGRAM(S): at 22:14

## 2024-05-21 RX ADMIN — OXYCODONE HYDROCHLORIDE 15 MILLIGRAM(S): 5 TABLET ORAL at 20:49

## 2024-05-21 NOTE — DISCHARGE NOTE PROVIDER - NSDCFUSCHEDAPPT_GEN_ALL_CORE_FT
[facility]  [facility] [address] [facility]  Scheduled Appointment: 06/03/2024

## 2024-05-21 NOTE — DISCHARGE NOTE PROVIDER - HOSPITAL COURSE
68yMale with history of OA presenting for lami/fusion L2-5. Risk and benefits of surgery were explained to the patient. The patient understood and agreed to proceed with surgery. Patient underwent the procedure with no intraoperative complications. Pt was brought in stable condition to the PACU. Once stable in PACU, pt was brought to the floor. During hospital stay pt was followed by Medicine, physical therapy, Home Care during this admission. Pt is stable for discharge to 68yMale with history of OA presenting for lami/fusion L2-5. Risk and benefits of surgery were explained to the patient. The patient understood and agreed to proceed with surgery. Patient underwent the procedure with no intraoperative complications. Pt was brought in stable condition to the PACU. Once stable in PACU, pt was brought to the floor. During hospital stay pt was followed by Medicine, physical therapy, Home Care during this admission. Pt is stable for discharge to home

## 2024-05-21 NOTE — DISCHARGE NOTE PROVIDER - NSDCFUADDINST_GEN_ALL_CORE_FT
May shower 2 days post op.  No direct water pressure over incision.  Change dressing daily as needed with a dry clean bandage.     No bending/lifting/twisting/pulling/pushing/carrying or driving. No blood thinners (not limited to but including) aspirin, motrin, alleve, naproxen, etc.

## 2024-05-21 NOTE — PHYSICAL THERAPY INITIAL EVALUATION ADULT - ASSISTIVE DEVICE FOR TRANSFER: SIT/STAND, REHAB EVAL
Case Management Discharge Note      Final Note: Home with family and [facility]. Notified C[facility] of dc today. No additional needs noted.....PRC    Provided Post Acute Provider List?: Yes  Post Acute Provider List: Home Health    Selected Continued Care - Discharged on 3/21/2022 Admission date: 3/17/2022 - Discharge disposition: Home or Self Care    Destination    No services have been selected for the patient.              Durable Medical Equipment    No services have been selected for the patient.              Dialysis/Infusion    No services have been selected for the patient.              Home Medical Care Coordination complete.    Service Provider Selected Services Address Phone Fax Patient Preferred    [facility]  [facility] [address] [address] [phone] --          Therapy    No services have been selected for the patient.              Community Resources    No services have been selected for the patient.              Community & DME    No services have been selected for the patient.                       Final Discharge Disposition Code: 06 - home with home health care  
rolling walker

## 2024-05-21 NOTE — DISCHARGE NOTE PROVIDER - CARE PROVIDER_API CALL
[facility]  Orthopaedic Surgery  [address] [place] [address]  Phone: [phone]  Fax: [phone]  Follow Up Time:

## 2024-05-21 NOTE — PHYSICAL THERAPY INITIAL EVALUATION ADULT - IMPAIRMENTS CONTRIBUTING IMPAIRED BED MOBILITY, REHAB EVAL
pain/impaired postural control/decreased ROM/decreased strength SB 47 bpm.  nonspecific ST abnormality

## 2024-05-21 NOTE — ASU PREOP CHECKLIST - AICD PRESENT
Subjective   Patient ID: Ellis is a 10 year old female who is accompanied by:mother     Chief Complaint   Patient presents with   • Headache     on saturday morning   • Fever     101  on saturday   • Sore Throat     in the morning       HPI     Sore throat x 2 days    She had a fever of 101.9F Saturday 3/7.  Family treated with tylenol and ibuprofen.  Last anti-pyretics were given 3/8 at 8:30pm.  The last time mom took her temperature Abdi 3/8, she was afebrile, but mom still gave her a dose of anti-pyretics around 8:30pm.  She is afebrile today.    +headache    Good po intake, good urinary output    Denies cough, rhinorrhea, abdominal pain, vomiting, diarrhea, ear pain      Review of Systems   All other systems reviewed and are negative.      Objective   Vitals:/58 (BP Location: RUE - Right upper extremity, Patient Position: Sitting, Cuff Size: Regular)   Pulse 72   Temp 98.5 °F (36.9 °C) (Tympanic)   Wt 33.4 kg (73 lb 10.1 oz)   Physical Exam  Constitutional:       General: She is active.      Appearance: Normal appearance. She is well-developed.   HENT:      Head: Normocephalic and atraumatic.      Right Ear: Tympanic membrane and external ear normal.      Left Ear: Tympanic membrane and external ear normal.      Nose: Nose normal.      Mouth/Throat:      Mouth: Mucous membranes are moist.      Pharynx: Oropharynx is clear. No oropharyngeal exudate, posterior oropharyngeal erythema or pharyngeal petechiae.      Tonsils: No tonsillar exudate. 0 on the right. 0 on the left.   Eyes:      General:         Right eye: No discharge.         Left eye: No discharge.      Extraocular Movements: Extraocular movements intact.      Conjunctiva/sclera: Conjunctivae normal.   Neck:      Musculoskeletal: Normal range of motion and neck supple.   Cardiovascular:      Rate and Rhythm: Normal rate and regular rhythm.      Heart sounds: Normal heart sounds, S1 normal and S2 normal.   Pulmonary:      Effort: Pulmonary  effort is normal.      Breath sounds: Normal breath sounds.   Musculoskeletal: Normal range of motion.   Skin:     General: Skin is warm and dry.      Findings: No rash.   Neurological:      General: No focal deficit present.      Mental Status: She is alert.      Cranial Nerves: No cranial nerve deficit.      Motor: No abnormal muscle tone.   Psychiatric:         Mood and Affect: Mood normal.         Assessment   Problem List Items Addressed This Visit     None      Visit Diagnoses     Acute pharyngitis, unspecified etiology    -  Primary    Patient has sore throat.  Her exam is unremarkable.  Patient had fever Saturday and Sunday, but none today. Treat symptomatically with fluids and honey.          Instructed to call if problem worsens or does not improve within the next 24 hours otherwise follow-up prn   no

## 2024-05-21 NOTE — BRIEF OPERATIVE NOTE - NSICDXBRIEFPROCEDURE_GEN_ALL_CORE_FT
PROCEDURES:  Laminectomy with fusion and instrumentation of lumbar spine at multiple levels 21-May-2024 14:55:47 L2-5 Wendie Coley

## 2024-05-21 NOTE — CONSULT NOTE ADULT - SUBJECTIVE AND OBJECTIVE BOX
ANGELA ELIZABETH is a 68y Male s/p DECOMPRESSION LAMINECTOMY FUSION L2-5 WITH IMAGE      w/ h/o HTN (hypertension)    HLD (hyperlipidemia)    Gout    H/O seasonal allergies      denies any chest pain shortness of breath palpitation dizziness lightheadedness nausea vomiting fever or chills    H/O arthroscopy of shoulder    H/O elbow surgery    H/O hand surgery        SH: doesnot smoke or drink at this time    No Known Allergies    acetaminophen     Tablet .. 975 milliGRAM(s) Oral every 8 hours  allopurinol 200 milliGRAM(s) Oral daily  atorvastatin 10 milliGRAM(s) Oral at bedtime  ceFAZolin   IVPB 2000 milliGRAM(s) IV Intermittent every 8 hours  cyclobenzaprine 10 milliGRAM(s) Oral every 8 hours PRN  diphenhydrAMINE Injectable 12.5 milliGRAM(s) IV Push every 6 hours PRN  HYDROmorphone  Injectable 0.5 milliGRAM(s) IV Push every 3 hours PRN  loratadine 10 milliGRAM(s) Oral daily  multivitamin 1 Tablet(s) Oral daily  ondansetron Injectable 4 milliGRAM(s) IV Push every 6 hours PRN  oxyCODONE    IR 10 milliGRAM(s) Oral every 4 hours PRN  oxyCODONE    IR 15 milliGRAM(s) Oral every 4 hours PRN  polyethylene glycol 3350 17 Gram(s) Oral two times a day  senna 2 Tablet(s) Oral at bedtime  sodium chloride 0.9% lock flush 3 milliLiter(s) IV Push every 8 hours    T(C): 36.3 (05-21-24 @ 17:39), Max: 36.7 (05-21-24 @ 06:56)  HR: 56 (05-21-24 @ 17:39) (55 - 78)  BP: 114/81 (05-21-24 @ 17:39) (108/65 - 162/98)  RR: 14 (05-21-24 @ 17:39) (10 - 21)  SpO2: 97% (05-21-24 @ 17:39) (94% - 100%)  HEENT unremarkable  neck no JVD or bruit  heart normal S1 S2 RRR no gallops or rubs  chest clear to auscultation  abd sof nontender non distended +bs  ext no calf tenderness    A/P   DVT PX  pain control  bowel regimen   wound care as per ortho  GI PX  antiemetics prn  incentive spirometer

## 2024-05-21 NOTE — BRIEF OPERATIVE NOTE - FIRST ASSIST LEVEL OF PARTICIPATION
After Visit Summary   5/23/2017    Juwan Latham    MRN: [id number]           Patient Information     Date Of Birth          1951        Visit Information        Provider Department      5/23/2017 10:30 AM Owen Davis MD [facility]        Care Instructions    MY TREATMENT INFORMATION FOR SLEEP APNEA-  Juwan Latham    MY CONTACT NUMBERS ARE:  [phone]  DOCTOR : Owen Davis  SLEEP CENTER :  [place]  CPAP EQUIPMENT     Your sleep apnea is controlled with auto-CPAP.   Continue use of CPAP for 7- 8 hours a night.  For mouth breathing , use chin strap if needed to relieve mouth leak  You have to meet compliance as below.    Objective measure goal  Compliance  Goal >70%  Leak   Goal < 10%  AHI  Goal < 5 events per hour   Usage  Goal >420 minutes       Follow up in 2 months.        Your BMI is Body mass index is 29.89 kg/(m^2).  Weight management is a personal decision.  If you are interested in exploring weight loss strategies, the following discussion covers the approaches that may be successful. Body mass index (BMI) is one way to tell whether you are at a healthy weight, overweight, or obese. It measures your weight in relation to your height.  A BMI of 18.5 to 24.9 is in the healthy range. A person with a BMI of 25 to 29.9 is considered overweight, and someone with a BMI of 30 or greater is considered obese. More than two-thirds of American adults are considered overweight or obese.  Being overweight or obese increases the risk for further weight gain. Excess weight may lead to heart disease and diabetes.  Creating and following plans for healthy eating and physical activity may help you improve your health.  Weight control is part of healthy lifestyle and includes exercise, emotional health, and healthy eating habits. Careful eating habits lifelong are the mainstay of weight control. Though there are significant health benefits from weight loss,  long-term weight loss with diet alone may be very difficult to achieve- studies show long-term success with dietary management in less than 10% of people. Attaining a healthy weight may be especially difficult to achieve in those with severe obesity. In some cases, medications, devices and surgical management might be considered.  What can you do?  If you are overweight or obese and are interested in methods for weight loss, you should discuss this with your provider.     Consider reducing daily calorie intake by 500 calories.     Keep a food journal.     Avoiding skipping meals, consider cutting portions instead.    Diet combined with exercise helps maintain muscle while optimizing fat loss. Strength training is particularly important for building and maintaining muscle mass. Exercise helps reduce stress, increase energy, and improves fitness. Increasing exercise without diet control, however, may not burn enough calories to loose weight.       Start walking three days a week 10-20 minutes at a time    Work towards walking thirty minutes five days a week     Eventually, increase the speed of your walking for 1-2 minutes at time    In addition, we recommend that you review healthy lifestyles and methods for weight loss available through the National Institutes of Health patient information sites:  http://win.niddk.nih.gov/publications/index.htm    And look into health and wellness programs that may be available through your health insurance provider, employer, local community center, or ondina club.    Weight management plan: Patient was referred to their PCP to discuss a diet and exercise plan.     Your blood pressure was checked while you were in clinic today.  Please read the guidelines below about what these numbers mean and what you should do about them.  Your systolic blood pressure is the top number.  This is the pressure when the heart is pumping.  Your diastolic blood pressure is the bottom number.  This is  the pressure in between beats.  If your systolic blood pressure is less than 120 and your diastolic blood pressure is less than 80, then your blood pressure is normal. There is nothing more that you need to do about it  If your systolic blood pressure is 120-139 or your diastolic blood pressure is 80-89, your blood pressure may be higher than it should be.  You should have your blood pressure re-checked within a year by a primary care provider.  If your systolic blood pressure is 140 or greater or your diastolic blood pressure is 90 or greater, you may have high blood pressure.  High blood pressure is treatable, but if left untreated over time it can put you at risk for heart attack, stroke, or kidney failure.  You should have your blood pressure re-checked by a primary care provider within the next four weeks.            Follow-ups after your visit        Your next 10 appointments already scheduled     Jarred 15, 2017  9:00 AM CDT   (Arrive by 8:45 AM)   New Patient Visit with Paulo Agarwal MD   [facility] ([facility])    [address] [place] [address]  4th [facility] [id number] [phone]            Oct 18, 2017 11:20 AM CDT   (Arrive by 11:05 AM)   CT CHEST W/O CONTRAST with UCCT1   [facility] [place] [facility] ([facility])    [address] [place] [id number] [phone]           Please bring any scans or X-rays taken at other hospitals, if similar tests were done. Also bring a list of your medicines, including vitamins, minerals and over-the-counter drugs. It is safest to leave personal items at home.  Be sure to tell your doctor:   If you have any allergies.   If there s any chance you are pregnant.   If you are breastfeeding.   If you have any special needs.  You do not need to do anything special to prepare.  Please wear loose clothing, such as a sweat suit or jogging  "clothes. Avoid snaps, zippers and other metal. We may ask you to undress and put on a hospital gown.            Oct 18, 2017 12:15 PM CDT   (Arrive by 12:00 PM)   Return Visit with GLORIA Arechiga [facility] ([facility])    [address]  2nd Floor  [facility] [address] [phone]              Who to contact     If you have questions or need follow up information about today's clinic visit or your schedule please contact [facility] directly at [phone].  Normal or non-critical lab and imaging results will be communicated to you by MyChart, letter or phone within 4 business days after the clinic has received the results. If you do not hear from us within 7 days, please contact the clinic through [employer] or phone. If you have a critical or abnormal lab result, we will notify you by phone as soon as possible.  Submit refill requests through [employer] or call your pharmacy and they will forward the refill request to us. Please allow 3 business days for your refill to be completed.          Additional Information About Your Visit        [employer] Information     [employer] gives you secure access to your electronic health record. If you see a primary care provider, you can also send messages to your care team and make appointments. If you have questions, please call your primary care clinic.  If you do not have a primary care provider, please call [phone] and they will assist you.        Care EveryWhere ID     This is your Care EveryWhere ID. This could be used by other organizations to access your [facility] medical records  [id number]        Your Vitals Were     Pulse Respirations Height Pulse Oximetry BMI (Body Mass Index)       71 18 1.702 m (5' 7.01\") 94% 29.89 kg/m2        Blood Pressure from Last 3 Encounters:   05/23/17 120/70   05/09/17 116/64   04/12/17 124/73    Weight from Last 3 Encounters:   05/23/17 " 86.6 kg (190 lb 14.7 oz)   05/09/17 86.6 kg (191 lb)   04/12/17 85.5 kg (188 lb 7.9 oz)              Today, you had the following     No orders found for display       Primary Care Provider Office Phone # Fax #    Julio Guidry Jr., [phone]       [facility] [address]  [place] [address]        Thank you!     Thank you for choosing [facility]  for your care. Our goal is always to provide you with excellent care. Hearing back from our patients is one way we can continue to improve our services. Please take a few minutes to complete the written survey that you may receive in the mail after your visit with us. Thank you!             Your Updated Medication List - Protect others around you: Learn how to safely use, store and throw away your medicines at www.disposemymeds.org.          This list is accurate as of: 5/23/17 11:00 AM.  Always use your most recent med list.                   Brand Name Dispense Instructions for use    acetaminophen 325 MG tablet    TYLENOL    100 tablet    Take 2 tablets (650 mg) by mouth every 6 hours Do not take more than 4,000 mg of acetaminophen (Tylenol) from all sources to prevent liver damage.       ALPRAZolam 0.5 MG tablet    XANAX    10 tablet    Take 1 tablet (0.5 mg) by mouth 3 times daily as needed for anxiety       buPROPion 150 MG 24 hr tablet    WELLBUTRIN XL    30 tablet    Take 1 tablet (150 mg) by mouth every morning       lisinopril 10 MG tablet    PRINIVIL/ZESTRIL         order for DME      Equipment ordered: RESMED Auto PAP Mask type: Nasal Settings: 8-16 CM H20  : CPAP       phosphorus tablet 250 mg 250 MG per tablet    K PHOS NEUTRAL    4 tablet    Take 2 tablets (500 mg) by mouth 3 times daily Take 2 tablets in the evening on Jose 3/5 and in the morning on Monday 3/6. Get your phosphorus level rechecked on Monday 3/6 at the [facility].          Full Procedure

## 2024-05-21 NOTE — PHYSICAL THERAPY INITIAL EVALUATION ADULT - GENERAL OBSERVATIONS, REHAB EVAL
Chart reviewed, pt encountered on supine, AxOx4, cooperative, wife at bedside, + SAMANTHA (2), + catheter intact, + IV disconnected before session.

## 2024-05-21 NOTE — PHYSICAL THERAPY INITIAL EVALUATION ADULT - ADDITIONAL COMMENTS
as per patient, pt lives with wife in a ranch style home with 2 platform steps where a walker can fit to get inside the home. pt was independent with no AD.

## 2024-05-21 NOTE — DISCHARGE NOTE PROVIDER - NSDCMRMEDTOKEN_GEN_ALL_CORE_FT
Allegra 180 mg oral tablet: 1 tab(s) orally once a day  allopurinol 200 mg oral tablet: 1 tab(s) orally once a day  atorvastatin 10 mg oral tablet: 1 tab(s) orally once a day  ecotrin daily:   gabapentin 300 mg oral capsule: 1 cap(s) orally  metoprolol succinate 50 mg oral capsule, extended release: 1 cap(s) orally once a day  Multiple Vitamins oral capsule: 1 cap(s) orally once a day  mupirocin 2% topical ointment: Apply topically to affected area 2 times a day   Allegra 180 mg oral tablet: 1 tab(s) orally once a day  allopurinol 200 mg oral tablet: 1 tab(s) orally once a day  atorvastatin 10 mg oral tablet: 1 tab(s) orally once a day  cyclobenzaprine 10 mg oral tablet: 1 tab(s) orally every 8 hours MDD: 3  gabapentin 300 mg oral capsule: 1 cap(s) orally 3 times a day  metoprolol succinate 50 mg oral capsule, extended release: 1 cap(s) orally once a day  Multiple Vitamins oral capsule: 1 cap(s) orally once a day  Narcan 4 mg/0.1 mL nasal spray: 4 milligram(s) intranasally once , repeat as necessary.   As needed. For suspected opiate overdose   Follow instructions on packet MDD: 0.2 ml  oxyCODONE 5 mg oral tablet: 1 tab(s) orally every 4 hours as needed for  pain 1 tab for mild/moderate pain, 2 tabs for severe pain MDD: 6

## 2024-05-21 NOTE — DISCHARGE NOTE PROVIDER - DISCHARGE DATE
08/08/23  Screened by: Norris Lu    Referring Provider     Pre- Screening: There is no height or weight on file to calculate BMI. Has patient been referred for a routine screening Colonoscopy? yes  Is the patient between 43-73 years old? yes      Previous Colonoscopy yes   If yes:    Date: 2016    [facility]    Reason:       SCHEDULING STAFF: If the patient is between 45yrs-49yrs, please advise patient to confirm benefits/coverage with their insurance company for a routine screening colonoscopy, some insurance carriers will only cover at [address] [place] [address] or older. If the patient is over 66years old, please schedule an office visit. Does the patient want to see a Gastroenterologist prior to their procedure OR are they having any GI symptoms? no    Has the patient been hospitalized or had abdominal surgery in the past 6 months? no    Does the patient use supplemental oxygen? no    Does the patient take Coumadin, Lovenox, Plavix, Elliquis, Xarelto, or other blood thinning medication? no    Has the patient had a stroke, cardiac event, or stent placed in the past year? no    SCHEDULING STAFF: If patient answers NO to above questions, then schedule procedure. If patient answers YES to above questions, then schedule office appointment. If patient is between 45yrs - 49yrs, please advise patient that we will have to confirm benefits & coverage with their insurance company for a routine screening colonoscopy.
Left message for patient to call back with why he see's his cardiologist.    Procedure Prep:  Miralax/Dulcolax    Sent Via:  Mail
Scheduled date of colonoscopy (as of today): 10/11    Physician performing colonoscopy: Florentino Brooks    Location of colonoscopy: [address] [place] [address]    Bowel prep reviewed with patient: KAITLIN/COLLIN    Instructions reviewed with patient by: [address] MAILED TO PT     Clearances:  Cardiologist Dr. Vamsi Cameron [phone]
26-May-2024

## 2024-05-22 LAB
ANION GAP SERPL CALC-SCNC: 7 MMOL/L — SIGNIFICANT CHANGE UP (ref 5–17)
BASOPHILS # BLD AUTO: 0.02 K/UL — SIGNIFICANT CHANGE UP (ref 0–0.2)
BASOPHILS NFR BLD AUTO: 0.1 % — SIGNIFICANT CHANGE UP (ref 0–2)
BUN SERPL-MCNC: 25 MG/DL — HIGH (ref 7–23)
CALCIUM SERPL-MCNC: 9.2 MG/DL — SIGNIFICANT CHANGE UP (ref 8.5–10.1)
CHLORIDE SERPL-SCNC: 104 MMOL/L — SIGNIFICANT CHANGE UP (ref 96–108)
CO2 SERPL-SCNC: 26 MMOL/L — SIGNIFICANT CHANGE UP (ref 22–31)
CREAT SERPL-MCNC: 1.4 MG/DL — HIGH (ref 0.5–1.3)
EGFR: 55 ML/MIN/1.73M2 — LOW
EOSINOPHIL # BLD AUTO: 0 K/UL — SIGNIFICANT CHANGE UP (ref 0–0.5)
EOSINOPHIL NFR BLD AUTO: 0 % — SIGNIFICANT CHANGE UP (ref 0–6)
GLUCOSE SERPL-MCNC: 128 MG/DL — HIGH (ref 70–99)
HCT VFR BLD CALC: 38 % — LOW (ref 39–50)
HGB BLD-MCNC: 13.4 G/DL — SIGNIFICANT CHANGE UP (ref 13–17)
IMM GRANULOCYTES NFR BLD AUTO: 0.8 % — SIGNIFICANT CHANGE UP (ref 0–0.9)
LYMPHOCYTES # BLD AUTO: 0.71 K/UL — LOW (ref 1–3.3)
LYMPHOCYTES # BLD AUTO: 4.1 % — LOW (ref 13–44)
MCHC RBC-ENTMCNC: 31.2 PG — SIGNIFICANT CHANGE UP (ref 27–34)
MCHC RBC-ENTMCNC: 35.3 G/DL — SIGNIFICANT CHANGE UP (ref 32–36)
MCV RBC AUTO: 88.6 FL — SIGNIFICANT CHANGE UP (ref 80–100)
MONOCYTES # BLD AUTO: 0.79 K/UL — SIGNIFICANT CHANGE UP (ref 0–0.9)
MONOCYTES NFR BLD AUTO: 4.6 % — SIGNIFICANT CHANGE UP (ref 2–14)
NEUTROPHILS # BLD AUTO: 15.54 K/UL — HIGH (ref 1.8–7.4)
NEUTROPHILS NFR BLD AUTO: 90.4 % — HIGH (ref 43–77)
NRBC # BLD: 0 /100 WBCS — SIGNIFICANT CHANGE UP (ref 0–0)
PLATELET # BLD AUTO: 242 K/UL — SIGNIFICANT CHANGE UP (ref 150–400)
POTASSIUM SERPL-MCNC: 4.9 MMOL/L — SIGNIFICANT CHANGE UP (ref 3.5–5.3)
POTASSIUM SERPL-SCNC: 4.9 MMOL/L — SIGNIFICANT CHANGE UP (ref 3.5–5.3)
RBC # BLD: 4.29 M/UL — SIGNIFICANT CHANGE UP (ref 4.2–5.8)
RBC # FLD: 12.2 % — SIGNIFICANT CHANGE UP (ref 10.3–14.5)
SODIUM SERPL-SCNC: 137 MMOL/L — SIGNIFICANT CHANGE UP (ref 135–145)
WBC # BLD: 17.19 K/UL — HIGH (ref 3.8–10.5)
WBC # FLD AUTO: 17.19 K/UL — HIGH (ref 3.8–10.5)

## 2024-05-22 RX ORDER — GABAPENTIN 400 MG/1
100 CAPSULE ORAL THREE TIMES A DAY
Refills: 0 | Status: DISCONTINUED | OUTPATIENT
Start: 2024-05-22 | End: 2024-05-26

## 2024-05-22 RX ADMIN — Medication 975 MILLIGRAM(S): at 21:07

## 2024-05-22 RX ADMIN — SODIUM CHLORIDE 115 MILLILITER(S): 9 INJECTION, SOLUTION INTRAVENOUS at 04:09

## 2024-05-22 RX ADMIN — Medication 975 MILLIGRAM(S): at 22:07

## 2024-05-22 RX ADMIN — LORATADINE 10 MILLIGRAM(S): 10 TABLET ORAL at 12:29

## 2024-05-22 RX ADMIN — Medication 975 MILLIGRAM(S): at 14:40

## 2024-05-22 RX ADMIN — SENNA PLUS 2 TABLET(S): 8.6 TABLET ORAL at 21:07

## 2024-05-22 RX ADMIN — SODIUM CHLORIDE 3 MILLILITER(S): 9 INJECTION INTRAMUSCULAR; INTRAVENOUS; SUBCUTANEOUS at 06:07

## 2024-05-22 RX ADMIN — Medication 100 MILLIGRAM(S): at 19:45

## 2024-05-22 RX ADMIN — Medication 100 MILLIGRAM(S): at 04:09

## 2024-05-22 RX ADMIN — Medication 200 MILLIGRAM(S): at 12:29

## 2024-05-22 RX ADMIN — Medication 975 MILLIGRAM(S): at 06:27

## 2024-05-22 RX ADMIN — OXYCODONE HYDROCHLORIDE 15 MILLIGRAM(S): 5 TABLET ORAL at 14:39

## 2024-05-22 RX ADMIN — OXYCODONE HYDROCHLORIDE 15 MILLIGRAM(S): 5 TABLET ORAL at 15:39

## 2024-05-22 RX ADMIN — OXYCODONE HYDROCHLORIDE 15 MILLIGRAM(S): 5 TABLET ORAL at 07:27

## 2024-05-22 RX ADMIN — OXYCODONE HYDROCHLORIDE 15 MILLIGRAM(S): 5 TABLET ORAL at 06:27

## 2024-05-22 RX ADMIN — OXYCODONE HYDROCHLORIDE 15 MILLIGRAM(S): 5 TABLET ORAL at 22:45

## 2024-05-22 RX ADMIN — Medication 1 TABLET(S): at 12:29

## 2024-05-22 RX ADMIN — Medication 100 MILLIGRAM(S): at 12:29

## 2024-05-22 RX ADMIN — Medication 975 MILLIGRAM(S): at 07:27

## 2024-05-22 RX ADMIN — Medication 975 MILLIGRAM(S): at 15:40

## 2024-05-22 RX ADMIN — SODIUM CHLORIDE 3 MILLILITER(S): 9 INJECTION INTRAMUSCULAR; INTRAVENOUS; SUBCUTANEOUS at 21:07

## 2024-05-22 RX ADMIN — POLYETHYLENE GLYCOL 3350 17 GRAM(S): 17 POWDER, FOR SOLUTION ORAL at 18:36

## 2024-05-22 RX ADMIN — POLYETHYLENE GLYCOL 3350 17 GRAM(S): 17 POWDER, FOR SOLUTION ORAL at 06:28

## 2024-05-22 RX ADMIN — GABAPENTIN 100 MILLIGRAM(S): 400 CAPSULE ORAL at 21:07

## 2024-05-22 RX ADMIN — ATORVASTATIN CALCIUM 10 MILLIGRAM(S): 80 TABLET, FILM COATED ORAL at 21:07

## 2024-05-22 RX ADMIN — OXYCODONE HYDROCHLORIDE 15 MILLIGRAM(S): 5 TABLET ORAL at 19:45

## 2024-05-22 RX ADMIN — SODIUM CHLORIDE 3 MILLILITER(S): 9 INJECTION INTRAMUSCULAR; INTRAVENOUS; SUBCUTANEOUS at 14:02

## 2024-05-22 RX ADMIN — GABAPENTIN 100 MILLIGRAM(S): 400 CAPSULE ORAL at 14:40

## 2024-05-22 RX ADMIN — Medication 50 MILLIGRAM(S): at 06:28

## 2024-05-23 LAB
ANION GAP SERPL CALC-SCNC: 6 MMOL/L — SIGNIFICANT CHANGE UP (ref 5–17)
BASOPHILS # BLD AUTO: 0.02 K/UL — SIGNIFICANT CHANGE UP (ref 0–0.2)
BASOPHILS NFR BLD AUTO: 0.1 % — SIGNIFICANT CHANGE UP (ref 0–2)
BUN SERPL-MCNC: 27 MG/DL — HIGH (ref 7–23)
CALCIUM SERPL-MCNC: 8.9 MG/DL — SIGNIFICANT CHANGE UP (ref 8.5–10.1)
CHLORIDE SERPL-SCNC: 103 MMOL/L — SIGNIFICANT CHANGE UP (ref 96–108)
CO2 SERPL-SCNC: 27 MMOL/L — SIGNIFICANT CHANGE UP (ref 22–31)
CREAT SERPL-MCNC: 1.27 MG/DL — SIGNIFICANT CHANGE UP (ref 0.5–1.3)
EGFR: 62 ML/MIN/1.73M2 — SIGNIFICANT CHANGE UP
EOSINOPHIL # BLD AUTO: 0.03 K/UL — SIGNIFICANT CHANGE UP (ref 0–0.5)
EOSINOPHIL NFR BLD AUTO: 0.2 % — SIGNIFICANT CHANGE UP (ref 0–6)
GLUCOSE SERPL-MCNC: 105 MG/DL — HIGH (ref 70–99)
HCT VFR BLD CALC: 36.4 % — LOW (ref 39–50)
HGB BLD-MCNC: 12.7 G/DL — LOW (ref 13–17)
IMM GRANULOCYTES NFR BLD AUTO: 0.5 % — SIGNIFICANT CHANGE UP (ref 0–0.9)
LYMPHOCYTES # BLD AUTO: 1.45 K/UL — SIGNIFICANT CHANGE UP (ref 1–3.3)
LYMPHOCYTES # BLD AUTO: 9.9 % — LOW (ref 13–44)
MCHC RBC-ENTMCNC: 31.1 PG — SIGNIFICANT CHANGE UP (ref 27–34)
MCHC RBC-ENTMCNC: 34.9 G/DL — SIGNIFICANT CHANGE UP (ref 32–36)
MCV RBC AUTO: 89 FL — SIGNIFICANT CHANGE UP (ref 80–100)
MONOCYTES # BLD AUTO: 1.42 K/UL — HIGH (ref 0–0.9)
MONOCYTES NFR BLD AUTO: 9.7 % — SIGNIFICANT CHANGE UP (ref 2–14)
NEUTROPHILS # BLD AUTO: 11.68 K/UL — HIGH (ref 1.8–7.4)
NEUTROPHILS NFR BLD AUTO: 79.6 % — HIGH (ref 43–77)
NRBC # BLD: 0 /100 WBCS — SIGNIFICANT CHANGE UP (ref 0–0)
PLATELET # BLD AUTO: 231 K/UL — SIGNIFICANT CHANGE UP (ref 150–400)
POTASSIUM SERPL-MCNC: 4.1 MMOL/L — SIGNIFICANT CHANGE UP (ref 3.5–5.3)
POTASSIUM SERPL-SCNC: 4.1 MMOL/L — SIGNIFICANT CHANGE UP (ref 3.5–5.3)
RBC # BLD: 4.09 M/UL — LOW (ref 4.2–5.8)
RBC # FLD: 12.5 % — SIGNIFICANT CHANGE UP (ref 10.3–14.5)
SODIUM SERPL-SCNC: 136 MMOL/L — SIGNIFICANT CHANGE UP (ref 135–145)
WBC # BLD: 14.68 K/UL — HIGH (ref 3.8–10.5)
WBC # FLD AUTO: 14.68 K/UL — HIGH (ref 3.8–10.5)

## 2024-05-23 RX ORDER — ENOXAPARIN SODIUM 100 MG/ML
40 INJECTION SUBCUTANEOUS EVERY 24 HOURS
Refills: 0 | Status: DISCONTINUED | OUTPATIENT
Start: 2024-05-24 | End: 2024-05-26

## 2024-05-23 RX ORDER — NALOXEGOL OXALATE 12.5 MG/1
25 TABLET, FILM COATED ORAL
Refills: 0 | Status: DISCONTINUED | OUTPATIENT
Start: 2024-05-23 | End: 2024-05-26

## 2024-05-23 RX ADMIN — Medication 100 MILLIGRAM(S): at 20:03

## 2024-05-23 RX ADMIN — OXYCODONE HYDROCHLORIDE 15 MILLIGRAM(S): 5 TABLET ORAL at 09:39

## 2024-05-23 RX ADMIN — SODIUM CHLORIDE 3 MILLILITER(S): 9 INJECTION INTRAMUSCULAR; INTRAVENOUS; SUBCUTANEOUS at 14:47

## 2024-05-23 RX ADMIN — SENNA PLUS 2 TABLET(S): 8.6 TABLET ORAL at 21:53

## 2024-05-23 RX ADMIN — Medication 975 MILLIGRAM(S): at 14:56

## 2024-05-23 RX ADMIN — POLYETHYLENE GLYCOL 3350 17 GRAM(S): 17 POWDER, FOR SOLUTION ORAL at 17:35

## 2024-05-23 RX ADMIN — OXYCODONE HYDROCHLORIDE 15 MILLIGRAM(S): 5 TABLET ORAL at 10:00

## 2024-05-23 RX ADMIN — GABAPENTIN 100 MILLIGRAM(S): 400 CAPSULE ORAL at 05:30

## 2024-05-23 RX ADMIN — Medication 975 MILLIGRAM(S): at 16:00

## 2024-05-23 RX ADMIN — ATORVASTATIN CALCIUM 10 MILLIGRAM(S): 80 TABLET, FILM COATED ORAL at 21:53

## 2024-05-23 RX ADMIN — Medication 975 MILLIGRAM(S): at 06:30

## 2024-05-23 RX ADMIN — Medication 1 TABLET(S): at 11:30

## 2024-05-23 RX ADMIN — Medication 100 MILLIGRAM(S): at 11:31

## 2024-05-23 RX ADMIN — HYDROMORPHONE HYDROCHLORIDE 0.5 MILLIGRAM(S): 2 INJECTION INTRAMUSCULAR; INTRAVENOUS; SUBCUTANEOUS at 08:00

## 2024-05-23 RX ADMIN — OXYCODONE HYDROCHLORIDE 15 MILLIGRAM(S): 5 TABLET ORAL at 21:00

## 2024-05-23 RX ADMIN — Medication 975 MILLIGRAM(S): at 22:53

## 2024-05-23 RX ADMIN — OXYCODONE HYDROCHLORIDE 15 MILLIGRAM(S): 5 TABLET ORAL at 05:06

## 2024-05-23 RX ADMIN — Medication 975 MILLIGRAM(S): at 21:53

## 2024-05-23 RX ADMIN — OXYCODONE HYDROCHLORIDE 15 MILLIGRAM(S): 5 TABLET ORAL at 04:06

## 2024-05-23 RX ADMIN — Medication 10 MILLIGRAM(S): at 12:01

## 2024-05-23 RX ADMIN — SODIUM CHLORIDE 3 MILLILITER(S): 9 INJECTION INTRAMUSCULAR; INTRAVENOUS; SUBCUTANEOUS at 05:30

## 2024-05-23 RX ADMIN — OXYCODONE HYDROCHLORIDE 10 MILLIGRAM(S): 5 TABLET ORAL at 16:16

## 2024-05-23 RX ADMIN — GABAPENTIN 100 MILLIGRAM(S): 400 CAPSULE ORAL at 14:56

## 2024-05-23 RX ADMIN — Medication 975 MILLIGRAM(S): at 05:30

## 2024-05-23 RX ADMIN — POLYETHYLENE GLYCOL 3350 17 GRAM(S): 17 POWDER, FOR SOLUTION ORAL at 05:30

## 2024-05-23 RX ADMIN — Medication 100 MILLIGRAM(S): at 04:06

## 2024-05-23 RX ADMIN — OXYCODONE HYDROCHLORIDE 15 MILLIGRAM(S): 5 TABLET ORAL at 20:00

## 2024-05-23 RX ADMIN — LORATADINE 10 MILLIGRAM(S): 10 TABLET ORAL at 11:30

## 2024-05-23 RX ADMIN — GABAPENTIN 100 MILLIGRAM(S): 400 CAPSULE ORAL at 21:53

## 2024-05-23 RX ADMIN — NALOXEGOL OXALATE 25 MILLIGRAM(S): 12.5 TABLET, FILM COATED ORAL at 13:46

## 2024-05-23 RX ADMIN — OXYCODONE HYDROCHLORIDE 10 MILLIGRAM(S): 5 TABLET ORAL at 17:00

## 2024-05-23 RX ADMIN — Medication 200 MILLIGRAM(S): at 11:30

## 2024-05-23 RX ADMIN — Medication 81 MILLIGRAM(S): at 11:30

## 2024-05-23 RX ADMIN — SODIUM CHLORIDE 3 MILLILITER(S): 9 INJECTION INTRAMUSCULAR; INTRAVENOUS; SUBCUTANEOUS at 21:52

## 2024-05-23 RX ADMIN — HYDROMORPHONE HYDROCHLORIDE 0.5 MILLIGRAM(S): 2 INJECTION INTRAMUSCULAR; INTRAVENOUS; SUBCUTANEOUS at 06:52

## 2024-05-24 LAB
ANION GAP SERPL CALC-SCNC: 5 MMOL/L — SIGNIFICANT CHANGE UP (ref 5–17)
BASOPHILS # BLD AUTO: 0.03 K/UL — SIGNIFICANT CHANGE UP (ref 0–0.2)
BASOPHILS NFR BLD AUTO: 0.3 % — SIGNIFICANT CHANGE UP (ref 0–2)
BUN SERPL-MCNC: 21 MG/DL — SIGNIFICANT CHANGE UP (ref 7–23)
CALCIUM SERPL-MCNC: 8.8 MG/DL — SIGNIFICANT CHANGE UP (ref 8.5–10.1)
CHLORIDE SERPL-SCNC: 101 MMOL/L — SIGNIFICANT CHANGE UP (ref 96–108)
CO2 SERPL-SCNC: 30 MMOL/L — SIGNIFICANT CHANGE UP (ref 22–31)
CREAT SERPL-MCNC: 1.2 MG/DL — SIGNIFICANT CHANGE UP (ref 0.5–1.3)
EGFR: 66 ML/MIN/1.73M2 — SIGNIFICANT CHANGE UP
EOSINOPHIL # BLD AUTO: 0.18 K/UL — SIGNIFICANT CHANGE UP (ref 0–0.5)
EOSINOPHIL NFR BLD AUTO: 1.6 % — SIGNIFICANT CHANGE UP (ref 0–6)
GLUCOSE SERPL-MCNC: 102 MG/DL — HIGH (ref 70–99)
HCT VFR BLD CALC: 38 % — LOW (ref 39–50)
HGB BLD-MCNC: 12.8 G/DL — LOW (ref 13–17)
IMM GRANULOCYTES NFR BLD AUTO: 0.8 % — SIGNIFICANT CHANGE UP (ref 0–0.9)
LYMPHOCYTES # BLD AUTO: 1.85 K/UL — SIGNIFICANT CHANGE UP (ref 1–3.3)
LYMPHOCYTES # BLD AUTO: 16.9 % — SIGNIFICANT CHANGE UP (ref 13–44)
MCHC RBC-ENTMCNC: 30.6 PG — SIGNIFICANT CHANGE UP (ref 27–34)
MCHC RBC-ENTMCNC: 33.7 G/DL — SIGNIFICANT CHANGE UP (ref 32–36)
MCV RBC AUTO: 90.9 FL — SIGNIFICANT CHANGE UP (ref 80–100)
MONOCYTES # BLD AUTO: 1.18 K/UL — HIGH (ref 0–0.9)
MONOCYTES NFR BLD AUTO: 10.8 % — SIGNIFICANT CHANGE UP (ref 2–14)
NEUTROPHILS # BLD AUTO: 7.59 K/UL — HIGH (ref 1.8–7.4)
NEUTROPHILS NFR BLD AUTO: 69.6 % — SIGNIFICANT CHANGE UP (ref 43–77)
NRBC # BLD: 0 /100 WBCS — SIGNIFICANT CHANGE UP (ref 0–0)
PLATELET # BLD AUTO: 201 K/UL — SIGNIFICANT CHANGE UP (ref 150–400)
POTASSIUM SERPL-MCNC: 3.7 MMOL/L — SIGNIFICANT CHANGE UP (ref 3.5–5.3)
POTASSIUM SERPL-SCNC: 3.7 MMOL/L — SIGNIFICANT CHANGE UP (ref 3.5–5.3)
RBC # BLD: 4.18 M/UL — LOW (ref 4.2–5.8)
RBC # FLD: 12.6 % — SIGNIFICANT CHANGE UP (ref 10.3–14.5)
SODIUM SERPL-SCNC: 136 MMOL/L — SIGNIFICANT CHANGE UP (ref 135–145)
WBC # BLD: 10.92 K/UL — HIGH (ref 3.8–10.5)
WBC # FLD AUTO: 10.92 K/UL — HIGH (ref 3.8–10.5)

## 2024-05-24 RX ORDER — SIMETHICONE 80 MG/1
80 TABLET, CHEWABLE ORAL EVERY 6 HOURS
Refills: 0 | Status: COMPLETED | OUTPATIENT
Start: 2024-05-24 | End: 2024-05-26

## 2024-05-24 RX ORDER — MAGNESIUM HYDROXIDE 400 MG/1
30 TABLET, CHEWABLE ORAL DAILY
Refills: 0 | Status: DISCONTINUED | OUTPATIENT
Start: 2024-05-24 | End: 2024-05-26

## 2024-05-24 RX ORDER — ACETAMINOPHEN 500 MG
1000 TABLET ORAL ONCE
Refills: 0 | Status: COMPLETED | OUTPATIENT
Start: 2024-05-24 | End: 2024-05-25

## 2024-05-24 RX ORDER — CYCLOBENZAPRINE HYDROCHLORIDE 10 MG/1
1 TABLET, FILM COATED ORAL
Qty: 21 | Refills: 0
Start: 2024-05-24 | End: 2024-05-30

## 2024-05-24 RX ORDER — KETOROLAC TROMETHAMINE 30 MG/ML
15 SYRINGE (ML) INJECTION ONCE
Refills: 0 | Status: DISCONTINUED | OUTPATIENT
Start: 2024-05-24 | End: 2024-05-25

## 2024-05-24 RX ORDER — NALOXONE HYDROCHLORIDE 4 MG/.1ML
4 SPRAY NASAL
Qty: 1 | Refills: 0
Start: 2024-05-24 | End: 2024-05-24

## 2024-05-24 RX ORDER — OXYCODONE HYDROCHLORIDE 5 MG/1
1 TABLET ORAL
Qty: 42 | Refills: 0
Start: 2024-05-24 | End: 2024-05-30

## 2024-05-24 RX ADMIN — OXYCODONE HYDROCHLORIDE 10 MILLIGRAM(S): 5 TABLET ORAL at 05:49

## 2024-05-24 RX ADMIN — ATORVASTATIN CALCIUM 10 MILLIGRAM(S): 80 TABLET, FILM COATED ORAL at 21:43

## 2024-05-24 RX ADMIN — GABAPENTIN 100 MILLIGRAM(S): 400 CAPSULE ORAL at 14:00

## 2024-05-24 RX ADMIN — NALOXEGOL OXALATE 25 MILLIGRAM(S): 12.5 TABLET, FILM COATED ORAL at 05:57

## 2024-05-24 RX ADMIN — Medication 200 MILLIGRAM(S): at 11:17

## 2024-05-24 RX ADMIN — Medication 10 MILLIGRAM(S): at 19:39

## 2024-05-24 RX ADMIN — OXYCODONE HYDROCHLORIDE 10 MILLIGRAM(S): 5 TABLET ORAL at 11:16

## 2024-05-24 RX ADMIN — SIMETHICONE 80 MILLIGRAM(S): 80 TABLET, CHEWABLE ORAL at 13:58

## 2024-05-24 RX ADMIN — ENOXAPARIN SODIUM 40 MILLIGRAM(S): 100 INJECTION SUBCUTANEOUS at 05:57

## 2024-05-24 RX ADMIN — Medication 50 MILLIGRAM(S): at 05:49

## 2024-05-24 RX ADMIN — Medication 975 MILLIGRAM(S): at 05:49

## 2024-05-24 RX ADMIN — Medication 81 MILLIGRAM(S): at 11:16

## 2024-05-24 RX ADMIN — POLYETHYLENE GLYCOL 3350 17 GRAM(S): 17 POWDER, FOR SOLUTION ORAL at 05:49

## 2024-05-24 RX ADMIN — OXYCODONE HYDROCHLORIDE 15 MILLIGRAM(S): 5 TABLET ORAL at 03:32

## 2024-05-24 RX ADMIN — Medication 100 MILLIGRAM(S): at 20:34

## 2024-05-24 RX ADMIN — OXYCODONE HYDROCHLORIDE 10 MILLIGRAM(S): 5 TABLET ORAL at 06:49

## 2024-05-24 RX ADMIN — Medication 975 MILLIGRAM(S): at 21:42

## 2024-05-24 RX ADMIN — Medication 975 MILLIGRAM(S): at 14:00

## 2024-05-24 RX ADMIN — Medication 1 TABLET(S): at 11:17

## 2024-05-24 RX ADMIN — SODIUM CHLORIDE 3 MILLILITER(S): 9 INJECTION INTRAMUSCULAR; INTRAVENOUS; SUBCUTANEOUS at 05:57

## 2024-05-24 RX ADMIN — Medication 100 MILLIGRAM(S): at 03:35

## 2024-05-24 RX ADMIN — Medication 975 MILLIGRAM(S): at 15:00

## 2024-05-24 RX ADMIN — CYCLOBENZAPRINE HYDROCHLORIDE 10 MILLIGRAM(S): 10 TABLET, FILM COATED ORAL at 03:34

## 2024-05-24 RX ADMIN — SODIUM CHLORIDE 3 MILLILITER(S): 9 INJECTION INTRAMUSCULAR; INTRAVENOUS; SUBCUTANEOUS at 15:00

## 2024-05-24 RX ADMIN — OXYCODONE HYDROCHLORIDE 10 MILLIGRAM(S): 5 TABLET ORAL at 12:16

## 2024-05-24 RX ADMIN — SODIUM CHLORIDE 3 MILLILITER(S): 9 INJECTION INTRAMUSCULAR; INTRAVENOUS; SUBCUTANEOUS at 21:43

## 2024-05-24 RX ADMIN — OXYCODONE HYDROCHLORIDE 15 MILLIGRAM(S): 5 TABLET ORAL at 20:08

## 2024-05-24 RX ADMIN — OXYCODONE HYDROCHLORIDE 15 MILLIGRAM(S): 5 TABLET ORAL at 19:08

## 2024-05-24 RX ADMIN — POLYETHYLENE GLYCOL 3350 17 GRAM(S): 17 POWDER, FOR SOLUTION ORAL at 18:59

## 2024-05-24 RX ADMIN — Medication 975 MILLIGRAM(S): at 22:42

## 2024-05-24 RX ADMIN — Medication 975 MILLIGRAM(S): at 06:49

## 2024-05-24 RX ADMIN — SENNA PLUS 2 TABLET(S): 8.6 TABLET ORAL at 21:43

## 2024-05-24 RX ADMIN — LORATADINE 10 MILLIGRAM(S): 10 TABLET ORAL at 11:16

## 2024-05-24 RX ADMIN — GABAPENTIN 100 MILLIGRAM(S): 400 CAPSULE ORAL at 21:43

## 2024-05-24 RX ADMIN — Medication 100 MILLIGRAM(S): at 11:17

## 2024-05-24 RX ADMIN — SIMETHICONE 80 MILLIGRAM(S): 80 TABLET, CHEWABLE ORAL at 19:09

## 2024-05-24 RX ADMIN — GABAPENTIN 100 MILLIGRAM(S): 400 CAPSULE ORAL at 05:49

## 2024-05-24 RX ADMIN — OXYCODONE HYDROCHLORIDE 15 MILLIGRAM(S): 5 TABLET ORAL at 04:32

## 2024-05-25 LAB — GLUCOSE BLDC GLUCOMTR-MCNC: 141 MG/DL — HIGH (ref 70–99)

## 2024-05-25 RX ORDER — HYDROMORPHONE HYDROCHLORIDE 2 MG/ML
2 INJECTION INTRAMUSCULAR; INTRAVENOUS; SUBCUTANEOUS ONCE
Refills: 0 | Status: DISCONTINUED | OUTPATIENT
Start: 2024-05-25 | End: 2024-05-25

## 2024-05-25 RX ORDER — HYDROMORPHONE HYDROCHLORIDE 2 MG/ML
0.5 INJECTION INTRAMUSCULAR; INTRAVENOUS; SUBCUTANEOUS ONCE
Refills: 0 | Status: DISCONTINUED | OUTPATIENT
Start: 2024-05-25 | End: 2024-05-26

## 2024-05-25 RX ADMIN — CYCLOBENZAPRINE HYDROCHLORIDE 10 MILLIGRAM(S): 10 TABLET, FILM COATED ORAL at 22:03

## 2024-05-25 RX ADMIN — GABAPENTIN 100 MILLIGRAM(S): 400 CAPSULE ORAL at 05:19

## 2024-05-25 RX ADMIN — Medication 15 MILLIGRAM(S): at 22:03

## 2024-05-25 RX ADMIN — NALOXEGOL OXALATE 25 MILLIGRAM(S): 12.5 TABLET, FILM COATED ORAL at 05:19

## 2024-05-25 RX ADMIN — Medication 975 MILLIGRAM(S): at 05:19

## 2024-05-25 RX ADMIN — Medication 1000 MILLIGRAM(S): at 21:59

## 2024-05-25 RX ADMIN — SIMETHICONE 80 MILLIGRAM(S): 80 TABLET, CHEWABLE ORAL at 06:53

## 2024-05-25 RX ADMIN — SODIUM CHLORIDE 3 MILLILITER(S): 9 INJECTION INTRAMUSCULAR; INTRAVENOUS; SUBCUTANEOUS at 05:19

## 2024-05-25 RX ADMIN — Medication 975 MILLIGRAM(S): at 14:08

## 2024-05-25 RX ADMIN — Medication 81 MILLIGRAM(S): at 12:08

## 2024-05-25 RX ADMIN — ATORVASTATIN CALCIUM 10 MILLIGRAM(S): 80 TABLET, FILM COATED ORAL at 21:02

## 2024-05-25 RX ADMIN — OXYCODONE HYDROCHLORIDE 10 MILLIGRAM(S): 5 TABLET ORAL at 11:14

## 2024-05-25 RX ADMIN — Medication 100 MILLIGRAM(S): at 12:09

## 2024-05-25 RX ADMIN — OXYCODONE HYDROCHLORIDE 10 MILLIGRAM(S): 5 TABLET ORAL at 10:14

## 2024-05-25 RX ADMIN — OXYCODONE HYDROCHLORIDE 10 MILLIGRAM(S): 5 TABLET ORAL at 03:38

## 2024-05-25 RX ADMIN — POLYETHYLENE GLYCOL 3350 17 GRAM(S): 17 POWDER, FOR SOLUTION ORAL at 17:41

## 2024-05-25 RX ADMIN — OXYCODONE HYDROCHLORIDE 10 MILLIGRAM(S): 5 TABLET ORAL at 19:32

## 2024-05-25 RX ADMIN — SIMETHICONE 80 MILLIGRAM(S): 80 TABLET, CHEWABLE ORAL at 12:10

## 2024-05-25 RX ADMIN — Medication 100 MILLIGRAM(S): at 05:18

## 2024-05-25 RX ADMIN — Medication 15 MILLIGRAM(S): at 23:03

## 2024-05-25 RX ADMIN — POLYETHYLENE GLYCOL 3350 17 GRAM(S): 17 POWDER, FOR SOLUTION ORAL at 05:19

## 2024-05-25 RX ADMIN — Medication 1 ENEMA: at 07:45

## 2024-05-25 RX ADMIN — Medication 975 MILLIGRAM(S): at 15:08

## 2024-05-25 RX ADMIN — OXYCODONE HYDROCHLORIDE 10 MILLIGRAM(S): 5 TABLET ORAL at 14:53

## 2024-05-25 RX ADMIN — Medication 1 TABLET(S): at 12:09

## 2024-05-25 RX ADMIN — SODIUM CHLORIDE 3 MILLILITER(S): 9 INJECTION INTRAMUSCULAR; INTRAVENOUS; SUBCUTANEOUS at 21:02

## 2024-05-25 RX ADMIN — OXYCODONE HYDROCHLORIDE 10 MILLIGRAM(S): 5 TABLET ORAL at 02:38

## 2024-05-25 RX ADMIN — OXYCODONE HYDROCHLORIDE 10 MILLIGRAM(S): 5 TABLET ORAL at 15:53

## 2024-05-25 RX ADMIN — SENNA PLUS 2 TABLET(S): 8.6 TABLET ORAL at 21:02

## 2024-05-25 RX ADMIN — Medication 975 MILLIGRAM(S): at 06:19

## 2024-05-25 RX ADMIN — GABAPENTIN 100 MILLIGRAM(S): 400 CAPSULE ORAL at 21:02

## 2024-05-25 RX ADMIN — LORATADINE 10 MILLIGRAM(S): 10 TABLET ORAL at 12:08

## 2024-05-25 RX ADMIN — Medication 200 MILLIGRAM(S): at 12:08

## 2024-05-25 RX ADMIN — ENOXAPARIN SODIUM 40 MILLIGRAM(S): 100 INJECTION SUBCUTANEOUS at 05:19

## 2024-05-25 RX ADMIN — SODIUM CHLORIDE 3 MILLILITER(S): 9 INJECTION INTRAMUSCULAR; INTRAVENOUS; SUBCUTANEOUS at 14:36

## 2024-05-25 RX ADMIN — Medication 400 MILLIGRAM(S): at 20:59

## 2024-05-25 RX ADMIN — GABAPENTIN 100 MILLIGRAM(S): 400 CAPSULE ORAL at 14:08

## 2024-05-25 RX ADMIN — Medication 100 MILLIGRAM(S): at 19:31

## 2024-05-25 RX ADMIN — OXYCODONE HYDROCHLORIDE 10 MILLIGRAM(S): 5 TABLET ORAL at 20:32

## 2024-05-25 RX ADMIN — SIMETHICONE 80 MILLIGRAM(S): 80 TABLET, CHEWABLE ORAL at 17:41

## 2024-05-25 RX ADMIN — ONDANSETRON 4 MILLIGRAM(S): 8 TABLET, FILM COATED ORAL at 20:32

## 2024-05-26 ENCOUNTER — TRANSCRIPTION ENCOUNTER (OUTPATIENT)
Age: 69
End: 2024-05-26

## 2024-05-26 VITALS
HEART RATE: 62 BPM | OXYGEN SATURATION: 94 % | DIASTOLIC BLOOD PRESSURE: 67 MMHG | SYSTOLIC BLOOD PRESSURE: 109 MMHG | RESPIRATION RATE: 17 BRPM

## 2024-05-26 RX ORDER — GABAPENTIN 400 MG/1
1 CAPSULE ORAL
Qty: 7 | Refills: 0
Start: 2024-05-26 | End: 2024-06-01

## 2024-05-26 RX ORDER — GABAPENTIN 400 MG/1
1 CAPSULE ORAL
Qty: 0 | Refills: 0 | DISCHARGE

## 2024-05-26 RX ADMIN — SIMETHICONE 80 MILLIGRAM(S): 80 TABLET, CHEWABLE ORAL at 05:36

## 2024-05-26 RX ADMIN — ENOXAPARIN SODIUM 40 MILLIGRAM(S): 100 INJECTION SUBCUTANEOUS at 05:36

## 2024-05-26 RX ADMIN — Medication 50 MILLIGRAM(S): at 05:37

## 2024-05-26 RX ADMIN — SODIUM CHLORIDE 3 MILLILITER(S): 9 INJECTION INTRAMUSCULAR; INTRAVENOUS; SUBCUTANEOUS at 05:47

## 2024-05-26 RX ADMIN — Medication 100 MILLIGRAM(S): at 05:36

## 2024-05-26 RX ADMIN — GABAPENTIN 100 MILLIGRAM(S): 400 CAPSULE ORAL at 05:37

## 2024-05-26 RX ADMIN — Medication 1 TABLET(S): at 11:41

## 2024-05-26 RX ADMIN — Medication 200 MILLIGRAM(S): at 11:41

## 2024-05-26 RX ADMIN — LORATADINE 10 MILLIGRAM(S): 10 TABLET ORAL at 11:41

## 2024-05-26 RX ADMIN — OXYCODONE HYDROCHLORIDE 10 MILLIGRAM(S): 5 TABLET ORAL at 10:30

## 2024-05-26 RX ADMIN — Medication 975 MILLIGRAM(S): at 06:37

## 2024-05-26 RX ADMIN — OXYCODONE HYDROCHLORIDE 10 MILLIGRAM(S): 5 TABLET ORAL at 09:34

## 2024-05-26 RX ADMIN — Medication 81 MILLIGRAM(S): at 11:41

## 2024-05-26 RX ADMIN — NALOXEGOL OXALATE 25 MILLIGRAM(S): 12.5 TABLET, FILM COATED ORAL at 05:36

## 2024-05-26 RX ADMIN — POLYETHYLENE GLYCOL 3350 17 GRAM(S): 17 POWDER, FOR SOLUTION ORAL at 05:37

## 2024-05-26 RX ADMIN — Medication 975 MILLIGRAM(S): at 05:37

## 2024-05-26 RX ADMIN — GABAPENTIN 100 MILLIGRAM(S): 400 CAPSULE ORAL at 13:21

## 2024-05-26 NOTE — DISCHARGE NOTE NURSING/CASE MANAGEMENT/SOCIAL WORK - NSDCFUADDAPPT_GEN_ALL_CORE_FT

## 2024-05-26 NOTE — DISCHARGE NOTE NURSING/CASE MANAGEMENT/SOCIAL WORK - PATIENT PORTAL LINK FT
You can access the FollowMyHealth Patient Portal offered by [facility] by registering at the following website: [email or web address]. By joining [employer]’s FollowMyHealth portal, you will also be able to view your health information using other applications (apps) compatible with our system.

## 2024-05-26 NOTE — PROGRESS NOTE ADULT - PROVIDER SPECIALTY LIST ADULT
Internal Medicine
Orthopedics
Orthopedics
Internal Medicine
Internal Medicine
Orthopedics

## 2024-05-26 NOTE — PROGRESS NOTE ADULT - SUBJECTIVE AND OBJECTIVE BOX
68yMale s/p lami/fusion L2-5 POD#1. Pt seen and examined in NAD. Pain controlled. Pt denies any new complaints. Pt denies CP/SOB/N/V/D/numbness/tingling/bowel or bladder dysfunction. No changes noted yet.   Samantha L: 60/80  / 410  Neuro: AAOX3  Spine: Dressing c/d/i  +SAMANTHA   B/L UE: Skin intact. +ROM shoulder/elbow/wrist/fingers. +ok/thumbsup/fingercross signs.  strength: 5/5.  RP2+ NVI.   B/L LE: Skin intact. +ROM hip/knee/ankle/toes. Ankle Dorsi/plantarflexion: 5/5. Calf: soft, compressible and nontender. DP/PT 2+ NVI.                             13.4   17.19 )-----------( 242      ( 22 May 2024 06:02 )             38.0       05-22    137  |  104  |  25<H>  ----------------------------<  128<H>  4.9   |  26  |  1.40<H>    Ca    9.2      22 May 2024 06:02          A/P: 68yMale s/p lami/fusion L2-5 POD#1   Gregoryior SAMANTHA drain output   Pain control   PT: WBAT - spinal precautions   DVT ppx: SCDs   Wound care, Isometric exercises, incentive spirometry   Medical consult appreciated  Discharge: planning for home pending drain output  All the above discussed and understood by pt   D/W Shanita 
68yMale s/p lami/fusion L2-5 POD#3  Pt seen and examined in NAD. Pain controlled. Pt denies any new complaints. Pt denies CP/SOB/N/V/D/numbness/tingling/bowel or bladder dysfunction. Leg pain improved now with post op back pain. Scant flatus. No BM yet.     Samantha L: 80/150  JPR (off suction) 45/130    Neuro: AAOX3  Abd: Moderately distended. No guarding.   Spine: Dressing stained serosanguinous. Incision: Sutures C/D/I. SAMANTHA drains with serosanguinous.   B/L UE: Skin intact. +ROM shoulder/elbow/wrist/fingers. +ok/thumbsup/fingercross signs.  strength: 5/5.  RP2+ NVI.   B/L LE: Skin intact. +ROM hip/knee/ankle/toes. Ankle Dorsi/plantarflexion: 5/5. Calf: soft, compressible and nontender. DP/PT 2+ NVI.                           12.8   10.92 )-----------( 201      ( 24 May 2024 06:20 )             38.0   05-24    136  |  101  |  21  ----------------------------<  102<H>  3.7   |  30  |  1.20    Ca    8.8      24 May 2024 06:20                 A/P: 68yMale s/p lami/fusion L2-5 POD#3  Dressings changed - guaze tagaderm applied, Montior SAMANTHA drain output   Pain control   F/U BM  PT: WBAT - spinal precautions   DVT ppx: SCDs, lovenox started today, home asa 81mg daily resumed.   Wound care, Isometric exercises, incentive spirometry   Medical consult appreciated  Discharge: planning for home pending drain output  All the above discussed and understood by pt   D/W Shanita 
ANGELA ELIZABETH is a 68y Male s/p DECOMPRESSION LAMINECTOMY FUSION L2-5 WITH IMAGE        denies any chest pain shortness of breath palpitation dizziness lightheadedness nausea vomiting fever or chills    T(C): 36.4 (05-23-24 @ 05:30), Max: 36.9 (05-22-24 @ 13:56)  HR: 59 (05-23-24 @ 05:30) (59 - 78)  BP: 129/75 (05-23-24 @ 05:30) (112/73 - 138/73)  RR: 18 (05-23-24 @ 05:30) (17 - 18)  SpO2: 97% (05-23-24 @ 05:30) (92% - 97%)  no jvd/bruit  s1 s2 rrr  cta  s/nt/nd  no calf tend                        12.7   14.68 )-----------( 231      ( 23 May 2024 06:55 )             36.4   05-23    136  |  103  |  27<H>  ----------------------------<  105<H>  4.1   |  27  |  1.27    Ca    8.9      23 May 2024 06:55        cont dvt px  pain control  bowel regimen  antiemetics  incentive spirometer
ANGELA ELIZABETH is a 68y Male s/p DECOMPRESSION LAMINECTOMY FUSION L2-5 WITH IMAGE        denies any chest pain shortness of breath palpitation dizziness lightheadedness nausea vomiting fever or chills    T(C): 36.9 (05-22-24 @ 13:56), Max: 36.9 (05-22-24 @ 13:56)  HR: 78 (05-22-24 @ 13:56) (54 - 78)  BP: 138/73 (05-22-24 @ 13:56) (102/73 - 162/98)  RR: 17 (05-22-24 @ 13:56) (10 - 21)  SpO2: 97% (05-22-24 @ 13:56) (92% - 100%)  no jvd/bruit  s1 s2 rrr  cta  s/nt/nd  no calf tend                        13.4   17.19 )-----------( 242      ( 22 May 2024 06:02 )             38.0   05-22    137  |  104  |  25<H>  ----------------------------<  128<H>  4.9   |  26  |  1.40<H>    Ca    9.2      22 May 2024 06:02        cont dvt px  pain control  bowel regimen  antiemetics  incentive spirometer
Last night, pt reported severe back pain, now resolved overnight. Pt seen at bedside this AM. No acute complaints, pain is well managed. Denies numbness, tingling, fevers, chills, CP, SOB, N/V/D, bowel/bladder incontinence.     Vital Signs (24 Hrs):  T(C): 36.6 (05-26-24 @ 05:45), Max: 36.9 (05-25-24 @ 17:24)  HR: 64 (05-26-24 @ 05:45) (59 - 75)  BP: 136/90 (05-26-24 @ 05:45) (107/76 - 157/89)  RR: 18 (05-26-24 @ 05:45) (18 - 18)  SpO2: 94% (05-26-24 @ 05:45) (94% - 97%)  Wt(kg): --    LABS:    Physical Exam:   General: NAD, patient laying in bed comfortably  Calves nontender  Compartments compressible, soft    Spine:  Dressings C/D/I  1 SAMANTHA drain in place    MOTOR EXAM:                       Hip Flex (L2)      Knee Ext (L3)      Ank Dorsiflex (L4)     Hallux Ext (L5)     Ank PlantarFlex (S1)  RIGHT               5/5                      5/5                          5/5                            5/5                           5/5  LEFT                  5/5                      5/5                          5/5                            5/5                           5/5    SENSORY EXAM:                    L2        L3       L4      L5       S1          RIGHT        2          2         2        2        2           (0=absent, 1=impaired, 2=normal, NT=not testable)  LEFT           2          2         2        2        2           (0=absent, 1=impaired, 2=normal, NT=not testable)    No pain with SLR, no clonus, no babinksi, no penn    A/P: 68yMale s/p lami/fusion L2-5 POD#5  Dressings changed - guaze tagaderm applied, Montior SAMANTHA drain output   Pain control   PT: WBAT - spinal precautions   DVT ppx: SCDs, lovenox started, home asa 81mg daily resumed.   Wound care, Isometric exercises, incentive spirometry   Medical consult appreciated  Discharge: planning for home pending drain output  All the above discussed and understood by pt   D/W Shanita   
ANGELA ELIZABETH is a 68y Male s/p DECOMPRESSION LAMINECTOMY FUSION L2-5 WITH IMAGE        denies any chest pain shortness of breath palpitation dizziness lightheadedness nausea vomiting fever or chills    T(C): 36.9 (05-24-24 @ 05:00), Max: 37.1 (05-23-24 @ 21:52)  HR: 86 (05-24-24 @ 05:00) (55 - 86)  BP: 127/83 (05-24-24 @ 05:00) (107/69 - 132/84)  RR: 19 (05-24-24 @ 05:00) (18 - 19)  SpO2: 95% (05-24-24 @ 05:00) (92% - 95%)  no jvd/bruit  s1 s2 rrr  cta  s/nt/nd  no calf tend                        12.8   10.92 )-----------( 201      ( 24 May 2024 06:20 )             38.0   05-24    136  |  101  |  21  ----------------------------<  102<H>  3.7   |  30  |  1.20    Ca    8.8      24 May 2024 06:20        cont dvt px  pain control  bowel regimen  antiemetics  incentive spirometer
Pt seen at bedside this AM. No acute complaints, pain is well managed. Denies numbness, tingling, fevers, chills, CP, SOB, N/V/D, bowel/bladder incontinence. Has not had a BM yet.    Vital Signs (24 Hrs):  T(C): 36.8 (05-25-24 @ 05:07), Max: 37.6 (05-24-24 @ 23:37)  HR: 59 (05-25-24 @ 05:07) (59 - 66)  BP: 112/75 (05-25-24 @ 05:07) (101/70 - 142/81)  RR: 18 (05-25-24 @ 05:07) (16 - 19)  SpO2: 91% (05-25-24 @ 05:07) (91% - 98%)  Wt(kg): --    LABS:                          12.8   10.92 )-----------( 201      ( 24 May 2024 06:20 )             38.0     05-24    136  |  101  |  21  ----------------------------<  102<H>  3.7   |  30  |  1.20    Ca    8.8      24 May 2024 06:20    Physical Exam:   General: NAD, patient laying in bed comfortably  Calves nontender  Compartments compressible, soft    Spine:  Dressings C/D/I    MOTOR EXAM:                       Hip Flex (L2)      Knee Ext (L3)      Ank Dorsiflex (L4)     Hallux Ext (L5)     Ank PlantarFlex (S1)  RIGHT               5/5                      5/5                          5/5                            5/5                           5/5  LEFT                  5/5                      5/5                          5/5                            5/5                           5/5    SENSORY EXAM:                    L2        L3       L4      L5       S1          RIGHT        2          2         2        2        2           (0=absent, 1=impaired, 2=normal, NT=not testable)  LEFT           2          2         2        2        2           (0=absent, 1=impaired, 2=normal, NT=not testable)    No pain with SLR, no clonus, no babinksi, no penn    A/P: 68yMale s/p lami/fusion L2-5 POD#4  Dressings changed - guaze tagaderm applied, Montior SAMANTHA drain output   Pain control   F/U BM  PT: WBAT - spinal precautions   DVT ppx: SCDs, lovenox started, home asa 81mg daily resumed.   Wound care, Isometric exercises, incentive spirometry   Medical consult appreciated  Discharge: planning for home pending drain output  All the above discussed and understood by pt   D/W Shanita   
68yMale s/p Lami/fusion L2-5 POD #0  Pt seen and examined in NAD. JPL, JPR, +Rachel.  Pain controlled.  Pt denies any new complaints.   Pt denies CP/SOB/N/V/D/numbness/tingling/bowel or bladder dysfunction.     Vital Signs Last 24 Hrs  T(C): 36.3 (21 May 2024 18:39), Max: 36.7 (21 May 2024 06:56)  T(F): 97.4 (21 May 2024 18:39), Max: 98 (21 May 2024 06:56)  HR: 54 (21 May 2024 18:39) (54 - 78)  BP: 123/81 (21 May 2024 18:39) (108/65 - 162/98)  BP(mean): 93 (21 May 2024 16:20) (80 - 122)  RR: 16 (21 May 2024 18:39) (10 - 21)  SpO2: 95% (21 May 2024 18:39) (94% - 100%)    Parameters below as of 21 May 2024 18:39  Patient On (Oxygen Delivery Method): nasal cannula        PE:   General: A&Ox3, NAD  L Spine: Dressing c/d/i +LJP +RJP   B/L UE: Skin intact. +ROM shoulder/elbow/wrist/fingers. +ok/thumbsup/fingercross signs.  strength: 5/5.  RP2+ NVI.   B/L LE: Skin intact. +ROM hip/knee/ankle/toes. Ankle Dorsi/plantarflexion: 5/5. Calf: soft, compressible and nontender. DP/PT 2+ NVI.                             13.8   14.59 )-----------( 268      ( 21 May 2024 15:20 )             39.4       05-21    144  |  119<H>  |  16  ----------------------------<  110<H>  4.0   |  19<L>  |  0.91    Ca    6.7<L>      21 May 2024 15:20        A/P: 68yMale s/p  Lami/fusion L2-5 POD #0  Monitor drains  ABX while drains are in  DC rachel when OOB and ambulating  Wound care  Pain controlled  PT: WBAT: Spinal precautions  Isometric exercises  DVT ppx: SCDs  Incentive spirometry counseled   Discharge: planning   All the above discussed and understood by pt   Pt seen and examined with Dr. Diehl  
68yMale s/p lami/fusion L2-5 POD#2. Pt seen and examined in NAD. Pain controlled. Pt denies any new complaints. Pt denies CP/SOB/N/V/D/numbness/tingling/bowel or bladder dysfunction. No changes noted yet.   Samantha L: 45/115  /385 - Taken off suction*  Neuro: AAOX3  Abd: Distended, NTTP  Spine: Dressing c/d/i  +SAMANTHA's both in place draining serosanguinous   B/L UE: Skin intact. +ROM shoulder/elbow/wrist/fingers. +ok/thumbsup/fingercross signs.  strength: 5/5.  RP2+ NVI.   B/L LE: Skin intact. +ROM hip/knee/ankle/toes. Ankle Dorsi/plantarflexion: 5/5. Calf: soft, compressible and nontender. DP/PT 2+ NVI.     05-23    136  |  103  |  27<H>  ----------------------------<  105<H>  4.1   |  27  |  1.27    Ca    8.9      23 May 2024 06:55                          12.7   14.68 )-----------( 231      ( 23 May 2024 06:55 )             36.4       A/P: 68yMale s/p lami/fusion L2-5 POD#3  GI regimen: Dulcolax suppository given   Monitor SAMANTHA drain output, keep RJP off suction  Pain control   PT: WBAT - spinal precautions   DVT ppx: SCDs. start lovenox 5/24 per Dr. Diehl, dc upon disharge   Wound care, Isometric exercises, incentive spirometry   Medical consult appreciated  Discharge: planning for home pending drain output  All the above discussed and understood by pt   Pt seen and examined with Dr. Diehl

## 2024-05-26 NOTE — DISCHARGE NOTE NURSING/CASE MANAGEMENT/SOCIAL WORK - NSDCPEFALRISK_GEN_ALL_CORE
For information on Fall & Injury Prevention, visit: [email or web address] OR  [email or web address] OR  https://www.cdc.gov/steadi/patient.html

## 2024-05-30 DIAGNOSIS — E78.5 HYPERLIPIDEMIA, UNSPECIFIED: ICD-10-CM

## 2024-05-30 DIAGNOSIS — M48.062 SPINAL STENOSIS, LUMBAR REGION WITH NEUROGENIC CLAUDICATION: ICD-10-CM

## 2024-05-30 DIAGNOSIS — M47.816 SPONDYLOSIS WITHOUT MYELOPATHY OR RADICULOPATHY, LUMBAR REGION: ICD-10-CM

## 2024-05-30 DIAGNOSIS — I10 ESSENTIAL (PRIMARY) HYPERTENSION: ICD-10-CM

## 2024-05-30 DIAGNOSIS — M10.9 GOUT, UNSPECIFIED: ICD-10-CM

## 2024-06-03 ENCOUNTER — APPOINTMENT (OUTPATIENT)
Dept: ORTHOPEDIC SURGERY | Facility: CLINIC | Age: 69
End: 2024-06-03
Payer: MEDICARE

## 2024-06-03 VITALS — HEIGHT: 72 IN | BODY MASS INDEX: 31.15 KG/M2 | WEIGHT: 230 LBS

## 2024-06-03 DIAGNOSIS — M48.062 SPINAL STENOSIS, LUMBAR REGION WITH NEUROGENIC CLAUDICATION: ICD-10-CM

## 2024-06-03 PROBLEM — E78.5 HYPERLIPIDEMIA, UNSPECIFIED: Chronic | Status: ACTIVE | Noted: 2024-05-01

## 2024-06-03 PROBLEM — Z88.9 ALLERGY STATUS TO UNSPECIFIED DRUGS, MEDICAMENTS AND BIOLOGICAL SUBSTANCES: Chronic | Status: ACTIVE | Noted: 2024-05-01

## 2024-06-03 PROBLEM — I10 ESSENTIAL (PRIMARY) HYPERTENSION: Chronic | Status: ACTIVE | Noted: 2024-05-01

## 2024-06-03 PROBLEM — M10.9 GOUT, UNSPECIFIED: Chronic | Status: ACTIVE | Noted: 2024-05-01

## 2024-06-03 PROCEDURE — 20974 ESTIM AID BONE HEALG N-INVAS: CPT | Mod: 58

## 2024-06-03 PROCEDURE — 99024 POSTOP FOLLOW-UP VISIT: CPT

## 2024-06-03 PROCEDURE — 72100 X-RAY EXAM L-S SPINE 2/3 VWS: CPT

## 2024-06-03 RX ORDER — CYCLOBENZAPRINE HYDROCHLORIDE 10 MG/1
10 TABLET, FILM COATED ORAL
Qty: 60 | Refills: 0 | Status: ACTIVE | COMMUNITY
Start: 2024-06-03 | End: 1900-01-01

## 2024-06-04 NOTE — DISCUSSION/SUMMARY
[Medication Risks Reviewed] : Medication risks reviewed [Surgical risks reviewed] : Surgical risks reviewed [de-identified] : now post op a few weeks out  looks pretty good at this point  wound care discussed  post op activity discussed  avoid heavy lifting/bending application of bone stim  fu in 4 weeks  walking for exercise  medicatoin renewed

## 2024-06-04 NOTE — HISTORY OF PRESENT ILLNESS
[Lower back] : lower back [8] : 8 [0] : 0 [Dull/Aching] : dull/aching [Meds] : meds [Standing] : standing [Walking] : walking [Lying in bed] : lying in bed [] : yes [de-identified] : 4/3/24:  69 YO m HERE AS nc FROM dr BARBER - BACK NOT TOO BAD - LEGS BAD - CAN ONLY WALK HALF A BLOCK - activity very limited   no injection No surgery on the spine  No PT/CHIRO/ACCUPUNCTURE NO   hld/HTN KIDNEY STONES YEARS AGO  NO HX OF CANCER nO LOSS OF BB CONTROL   mrI L SPINE 3/26/24 - severe stenosis noted - [facility] report   XRAYS REVIEWED: l SPINE - LOSS OF DISC HEIGHT, spondylolisthesis L4-5, severe facet arthropathy   RETIRED    5/6/24: Here to follow up back and leg pain. Seen by Pain managment/Dr Neri with MDP and teresita 300mg QHS with some improvment but still pain. Severe leg pain worse with walking any distance. Pain in back of both thighs. Improves with rest. Cant really walk far activities quite limited   6/3/24: Here for fu - plan at last was surgery - now about 2 weeks  using walker  wound has been dry  almost 2 weeks out at home  has run out of pain medicatoin pain in the back and the legs at this point   xrays today: L spine- implants in good positoin [FreeTextEntry5] : Patient is here for first post op appointment. [FreeTextEntry7] : both legs [de-identified] : xr and mri

## 2024-06-07 ENCOUNTER — NON-APPOINTMENT (OUTPATIENT)
Age: 69
End: 2024-06-07

## 2024-06-18 RX ORDER — OXYCODONE 5 MG/1
5 TABLET ORAL EVERY 8 HOURS
Qty: 30 | Refills: 0 | Status: ACTIVE | COMMUNITY
Start: 2024-06-03 | End: 1900-01-01

## 2024-06-26 ENCOUNTER — APPOINTMENT (OUTPATIENT)
Dept: ORTHOPEDIC SURGERY | Facility: CLINIC | Age: 69
End: 2024-06-26
Payer: MEDICARE

## 2024-06-26 ENCOUNTER — RESULT CHARGE (OUTPATIENT)
Age: 69
End: 2024-06-26

## 2024-06-26 DIAGNOSIS — M54.16 RADICULOPATHY, LUMBAR REGION: ICD-10-CM

## 2024-06-26 DIAGNOSIS — M51.36 OTHER INTERVERTEBRAL DISC DEGENERATION, LUMBAR REGION: ICD-10-CM

## 2024-06-26 DIAGNOSIS — M47.816 SPONDYLOSIS W/OUT MYELOPATHY OR RADICULOPATHY, LUMBAR REGION: ICD-10-CM

## 2024-06-26 PROCEDURE — 72100 X-RAY EXAM L-S SPINE 2/3 VWS: CPT

## 2024-06-26 PROCEDURE — 99024 POSTOP FOLLOW-UP VISIT: CPT

## 2024-06-26 PROCEDURE — 72170 X-RAY EXAM OF PELVIS: CPT

## 2024-07-02 ENCOUNTER — NON-APPOINTMENT (OUTPATIENT)
Age: 69
End: 2024-07-02

## 2024-08-09 ENCOUNTER — APPOINTMENT (OUTPATIENT)
Dept: ORTHOPEDIC SURGERY | Facility: CLINIC | Age: 69
End: 2024-08-09

## 2024-08-09 PROBLEM — Z98.890 STATUS POST LUMBAR SURGERY: Status: ACTIVE | Noted: 2024-08-09

## 2024-08-09 PROCEDURE — 72170 X-RAY EXAM OF PELVIS: CPT

## 2024-08-09 PROCEDURE — 99024 POSTOP FOLLOW-UP VISIT: CPT

## 2024-08-09 PROCEDURE — 72100 X-RAY EXAM L-S SPINE 2/3 VWS: CPT

## 2024-08-09 NOTE — DISCUSSION/SUMMARY
[Medication Risks Reviewed] : Medication risks reviewed [Surgical risks reviewed] : Surgical risks reviewed [de-identified] : post op care  looks pretty good at this poiwound care discussed  post op activity discussed  avoid heavy lifting/bending continue application of bone stim  fu in 4 weeks  walking for exercise  discussed trying med for qhs

## 2024-08-09 NOTE — HISTORY OF PRESENT ILLNESS
[Lower back] : lower back [0] : 0 [Dull/Aching] : dull/aching [Meds] : meds [Standing] : standing [Walking] : walking [Lying in bed] : lying in bed [] : yes [4] : 4 [de-identified] : 4/3/24:  69 YO m HERE AS nc FROM dr BARBER - BACK NOT TOO BAD - LEGS BAD - CAN ONLY WALK HALF A BLOCK - activity very limited   no injection No surgery on the spine  No PT/CHIRO/ACCUPUNCTURE NO   hld/HTN KIDNEY STONES YEARS AGO  NO HX OF CANCER nO LOSS OF BB CONTROL   mrI L SPINE 3/26/24 - severe stenosis noted - [facility] report   XRAYS REVIEWED: l SPINE - LOSS OF DISC HEIGHT, spondylolisthesis L4-5, severe facet arthropathy   RETIRED    5/6/24: Here to follow up back and leg pain. Seen by Pain managment/Dr Neri with MDP and teresita 300mg QHS with some improvment but still pain. Severe leg pain worse with walking any distance. Pain in back of both thighs. Improves with rest. Cant really walk far activities quite limited   6/3/24: Here for fu - plan at last was surgery - now about 2 weeks  using walker  wound has been dry  almost 2 weeks out at home  has run out of pain medicatoin pain in the back and the legs at this point   xrays today: L spine- implants in good positoin  6.26.24 Patient here for lower back pain. Patient states he feels improvement everyday   pLAN AT LAST WAS "now post op a few weeks out  looks pretty good at this point  wound care discussed  post op activity discussed  avoid heavy lifting/bending application of bone stim  fu in 4 weeks  walking for exercise  medicatoin renewed"  back pain getting better  walking better   xrasy today: L spine - implants in good position AP PELVIS - negative  08/09/2024: Follow up visit. Post op #3, 12 weeks from surgery. patient states he is feeling a little better as each day goes by. He is doing PT twice a week. Patient states he is still in some pain and discomfort when as his day goes on, when walking, and at night. Patient states the pain is not letting him sleep and he wakes up in pain when he does end up falling asleep.  He is not standing up straight when he walks, back pain with ambulation. He has to rest after a few minutes.   xrays today L spine - implants in good position AP Pelvis - negative [FreeTextEntry5] : Patient is here for first post op appointment. [FreeTextEntry7] : both legs [de-identified] : xr and mri

## 2024-08-09 NOTE — DISCUSSION/SUMMARY
[Medication Risks Reviewed] : Medication risks reviewed [Surgical risks reviewed] : Surgical risks reviewed [de-identified] : post op care  looks pretty good at this poiwound care discussed  post op activity discussed  avoid heavy lifting/bending continue application of bone stim  fu in 4 weeks  walking for exercise  discussed trying med for qhs

## 2024-08-09 NOTE — HISTORY OF PRESENT ILLNESS
[Lower back] : lower back [0] : 0 [Dull/Aching] : dull/aching [Meds] : meds [Standing] : standing [Walking] : walking [Lying in bed] : lying in bed [] : yes [4] : 4 [de-identified] : 4/3/24:  69 YO m HERE AS nc FROM dr BARBER - BACK NOT TOO BAD - LEGS BAD - CAN ONLY WALK HALF A BLOCK - activity very limited   no injection No surgery on the spine  No PT/CHIRO/ACCUPUNCTURE NO   hld/HTN KIDNEY STONES YEARS AGO  NO HX OF CANCER nO LOSS OF BB CONTROL   mrI L SPINE 3/26/24 - severe stenosis noted - [facility] report   XRAYS REVIEWED: l SPINE - LOSS OF DISC HEIGHT, spondylolisthesis L4-5, severe facet arthropathy   RETIRED    5/6/24: Here to follow up back and leg pain. Seen by Pain managment/Dr Neri with MDP and teresita 300mg QHS with some improvment but still pain. Severe leg pain worse with walking any distance. Pain in back of both thighs. Improves with rest. Cant really walk far activities quite limited   6/3/24: Here for fu - plan at last was surgery - now about 2 weeks  using walker  wound has been dry  almost 2 weeks out at home  has run out of pain medicatoin pain in the back and the legs at this point   xrays today: L spine- implants in good positoin  6.26.24 Patient here for lower back pain. Patient states he feels improvement everyday   pLAN AT LAST WAS "now post op a few weeks out  looks pretty good at this point  wound care discussed  post op activity discussed  avoid heavy lifting/bending application of bone stim  fu in 4 weeks  walking for exercise  medicatoin renewed"  back pain getting better  walking better   xrasy today: L spine - implants in good position AP PELVIS - negative  08/09/2024: Follow up visit. Post op #3, 12 weeks from surgery. patient states he is feeling a little better as each day goes by. He is doing PT twice a week. Patient states he is still in some pain and discomfort when as his day goes on, when walking, and at night. Patient states the pain is not letting him sleep and he wakes up in pain when he does end up falling asleep.  He is not standing up straight when he walks, back pain with ambulation. He has to rest after a few minutes.   xrays today L spine - implants in good position AP Pelvis - negative [FreeTextEntry5] : Patient is here for first post op appointment. [FreeTextEntry7] : both legs [de-identified] : xr and mri

## 2024-09-16 ENCOUNTER — APPOINTMENT (OUTPATIENT)
Dept: ORTHOPEDIC SURGERY | Facility: CLINIC | Age: 69
End: 2024-09-16
Payer: MEDICARE

## 2024-09-16 DIAGNOSIS — M54.16 RADICULOPATHY, LUMBAR REGION: ICD-10-CM

## 2024-09-16 PROCEDURE — 99214 OFFICE O/P EST MOD 30 MIN: CPT

## 2024-09-16 PROCEDURE — 72170 X-RAY EXAM OF PELVIS: CPT

## 2024-09-16 PROCEDURE — 72110 X-RAY EXAM L-2 SPINE 4/>VWS: CPT

## 2024-09-17 NOTE — HISTORY OF PRESENT ILLNESS
[Lower back] : lower back [4] : 4 [0] : 0 [Dull/Aching] : dull/aching [Meds] : meds [Standing] : standing [Walking] : walking [Lying in bed] : lying in bed [] : yes [de-identified] : 4/3/24:  69 YO m HERE AS nc FROM dr BARBER - BACK NOT TOO BAD - LEGS BAD - CAN ONLY WALK HALF A BLOCK - activity very limited   no injection No surgery on the spine  No PT/CHIRO/ACCUPUNCTURE NO   hld/HTN KIDNEY STONES YEARS AGO  NO HX OF CANCER nO LOSS OF BB CONTROL   mrI L SPINE 3/26/24 - severe stenosis noted - [facility] report   XRAYS REVIEWED: l SPINE - LOSS OF DISC HEIGHT, spondylolisthesis L4-5, severe facet arthropathy   RETIRED    5/6/24: Here to follow up back and leg pain. Seen by Pain managment/Dr Neri with MDP and teresita 300mg QHS with some improvment but still pain. Severe leg pain worse with walking any distance. Pain in back of both thighs. Improves with rest. Cant really walk far activities quite limited   6/3/24: Here for fu - plan at last was surgery - now about 2 weeks  using walker  wound has been dry  almost 2 weeks out at home  has run out of pain medicatoin pain in the back and the legs at this point   xrays today: L spine- implants in good positoin  6.26.24 Patient here for lower back pain. Patient states he feels improvement everyday   pLAN AT LAST WAS "now post op a few weeks out  looks pretty good at this point  wound care discussed  post op activity discussed  /avoid heavy lifting/bending application of bone stim  fu in 4 weeks  walking for exercise  medicatoin renewed"  back pain getting better  walking better   xrasy today: L spine - implants in good position AP PELVIS - negative  08/09/2024: Follow up visit. Post op #3, 12 weeks from surgery. patient states he is feeling a little better as each day goes by. He is doing PT twice a week. Patient states he is still in some pain and discomfort when as his day goes on, when walking, and at night. Patient states the pain is not letting him sleep and he wakes up in pain when he does end up falling asleep.  He is not standing up straight when he walks, back pain with ambulation. He has to rest after a few minutes.   xrays today L spine - implants in good position AP Pelvis - negative  9/16/24: Here for fu - plan at last was PT - States PT 2x a week which has helped.  Legs doing pretty well has been using the bone stim activity limited with walking distances  xrays today L spine - implants in good position AP Pelvis - negative  [FreeTextEntry5] : Patient is here for first post op appointment. [FreeTextEntry7] : both legs [de-identified] : xr and mri

## 2024-09-17 NOTE — DISCUSSION/SUMMARY
[Medication Risks Reviewed] : Medication risks reviewed [Surgical risks reviewed] : Surgical risks reviewed [de-identified] : post op care  signs of healing on the xray post op activity discussed  avoid heavy lifting/bending can dc bone stim  fu in 6-8 weeks  walking for exercise

## 2024-09-17 NOTE — HISTORY OF PRESENT ILLNESS
[Lower back] : lower back [4] : 4 [0] : 0 [Dull/Aching] : dull/aching [Meds] : meds [Standing] : standing [Walking] : walking [Lying in bed] : lying in bed [] : yes [de-identified] : 4/3/24:  67 YO m HERE AS nc FROM dr BARBER - BACK NOT TOO BAD - LEGS BAD - CAN ONLY WALK HALF A BLOCK - activity very limited   no injection No surgery on the spine  No PT/CHIRO/ACCUPUNCTURE NO   hld/HTN KIDNEY STONES YEARS AGO  NO HX OF CANCER nO LOSS OF BB CONTROL   mrI L SPINE 3/26/24 - severe stenosis noted - [facility] report   XRAYS REVIEWED: l SPINE - LOSS OF DISC HEIGHT, spondylolisthesis L4-5, severe facet arthropathy   RETIRED    5/6/24: Here to follow up back and leg pain. Seen by Pain managment/Dr Neri with MDP and teresita 300mg QHS with some improvment but still pain. Severe leg pain worse with walking any distance. Pain in back of both thighs. Improves with rest. Cant really walk far activities quite limited   6/3/24: Here for fu - plan at last was surgery - now about 2 weeks  using walker  wound has been dry  almost 2 weeks out at home  has run out of pain medicatoin pain in the back and the legs at this point   xrays today: L spine- implants in good positoin  6.26.24 Patient here for lower back pain. Patient states he feels improvement everyday   pLAN AT LAST WAS "now post op a few weeks out  looks pretty good at this point  wound care discussed  post op activity discussed  /avoid heavy lifting/bending application of bone stim  fu in 4 weeks  walking for exercise  medicatoin renewed"  back pain getting better  walking better   xrasy today: L spine - implants in good position AP PELVIS - negative  08/09/2024: Follow up visit. Post op #3, 12 weeks from surgery. patient states he is feeling a little better as each day goes by. He is doing PT twice a week. Patient states he is still in some pain and discomfort when as his day goes on, when walking, and at night. Patient states the pain is not letting him sleep and he wakes up in pain when he does end up falling asleep.  He is not standing up straight when he walks, back pain with ambulation. He has to rest after a few minutes.   xrays today L spine - implants in good position AP Pelvis - negative  9/16/24: Here for fu - plan at last was PT - States PT 2x a week which has helped.  Legs doing pretty well has been using the bone stim activity limited with walking distances  xrays today L spine - implants in good position AP Pelvis - negative  [FreeTextEntry5] : Patient is here for first post op appointment. [FreeTextEntry7] : both legs [de-identified] : xr and mri

## 2024-09-17 NOTE — DISCUSSION/SUMMARY
[Medication Risks Reviewed] : Medication risks reviewed [Surgical risks reviewed] : Surgical risks reviewed [de-identified] : post op care  signs of healing on the xray post op activity discussed  avoid heavy lifting/bending can dc bone stim  fu in 6-8 weeks  walking for exercise

## 2024-11-02 ENCOUNTER — NON-APPOINTMENT (OUTPATIENT)
Age: 69
End: 2024-11-02

## 2024-12-30 ENCOUNTER — APPOINTMENT (OUTPATIENT)
Dept: ORTHOPEDIC SURGERY | Facility: CLINIC | Age: 69
End: 2024-12-30
Payer: MEDICARE

## 2024-12-30 ENCOUNTER — RESULT CHARGE (OUTPATIENT)
Age: 69
End: 2024-12-30

## 2024-12-30 VITALS — WEIGHT: 232 LBS | HEIGHT: 72 IN | BODY MASS INDEX: 31.42 KG/M2

## 2024-12-30 DIAGNOSIS — M48.062 SPINAL STENOSIS, LUMBAR REGION WITH NEUROGENIC CLAUDICATION: ICD-10-CM

## 2024-12-30 DIAGNOSIS — Z98.890 OTHER SPECIFIED POSTPROCEDURAL STATES: ICD-10-CM

## 2024-12-30 PROCEDURE — 72110 X-RAY EXAM L-2 SPINE 4/>VWS: CPT

## 2024-12-30 PROCEDURE — 72170 X-RAY EXAM OF PELVIS: CPT

## 2024-12-30 PROCEDURE — 99214 OFFICE O/P EST MOD 30 MIN: CPT

## 2025-05-19 ENCOUNTER — APPOINTMENT (OUTPATIENT)
Dept: ORTHOPEDIC SURGERY | Facility: CLINIC | Age: 70
End: 2025-05-19
Payer: MEDICARE

## 2025-05-19 DIAGNOSIS — M54.16 RADICULOPATHY, LUMBAR REGION: ICD-10-CM

## 2025-05-19 DIAGNOSIS — M47.816 SPONDYLOSIS W/OUT MYELOPATHY OR RADICULOPATHY, LUMBAR REGION: ICD-10-CM

## 2025-05-19 PROCEDURE — 99214 OFFICE O/P EST MOD 30 MIN: CPT

## 2025-05-19 PROCEDURE — 72100 X-RAY EXAM L-S SPINE 2/3 VWS: CPT

## 2025-05-19 RX ORDER — IBUPROFEN 800 MG/1
800 TABLET, FILM COATED ORAL
Qty: 90 | Refills: 0 | Status: ACTIVE | COMMUNITY
Start: 2025-05-19 | End: 1900-01-01

## 2025-06-04 ENCOUNTER — APPOINTMENT (OUTPATIENT)
Dept: MRI IMAGING | Facility: CLINIC | Age: 70
End: 2025-06-04
Payer: MEDICARE

## 2025-06-04 PROCEDURE — 72148 MRI LUMBAR SPINE W/O DYE: CPT

## 2025-06-23 ENCOUNTER — NON-APPOINTMENT (OUTPATIENT)
Age: 70
End: 2025-06-23

## 2025-06-23 ENCOUNTER — APPOINTMENT (OUTPATIENT)
Dept: ORTHOPEDIC SURGERY | Facility: CLINIC | Age: 70
End: 2025-06-23
Payer: MEDICARE

## 2025-06-23 ENCOUNTER — APPOINTMENT (OUTPATIENT)
Dept: CARDIOLOGY | Facility: CLINIC | Age: 70
End: 2025-06-23
Payer: MEDICARE

## 2025-06-23 VITALS
BODY MASS INDEX: 31.69 KG/M2 | HEART RATE: 61 BPM | DIASTOLIC BLOOD PRESSURE: 85 MMHG | WEIGHT: 234 LBS | HEIGHT: 72 IN | SYSTOLIC BLOOD PRESSURE: 139 MMHG | OXYGEN SATURATION: 97 %

## 2025-06-23 VITALS — DIASTOLIC BLOOD PRESSURE: 80 MMHG | SYSTOLIC BLOOD PRESSURE: 132 MMHG

## 2025-06-23 PROBLEM — Q23.81 BICUSPID AORTIC VALVE: Status: ACTIVE | Noted: 2024-04-03

## 2025-06-23 PROBLEM — I45.10 RIGHT BUNDLE BRANCH BLOCK: Status: ACTIVE | Noted: 2025-06-23

## 2025-06-23 PROCEDURE — 93000 ELECTROCARDIOGRAM COMPLETE: CPT

## 2025-06-23 PROCEDURE — G2211 COMPLEX E/M VISIT ADD ON: CPT

## 2025-06-23 PROCEDURE — 99214 OFFICE O/P EST MOD 30 MIN: CPT

## 2025-06-23 PROCEDURE — 99204 OFFICE O/P NEW MOD 45 MIN: CPT

## 2025-06-24 ENCOUNTER — APPOINTMENT (OUTPATIENT)
Dept: PULMONOLOGY | Facility: CLINIC | Age: 70
End: 2025-06-24
Payer: MEDICARE

## 2025-06-24 VITALS — SYSTOLIC BLOOD PRESSURE: 122 MMHG | DIASTOLIC BLOOD PRESSURE: 77 MMHG | HEART RATE: 60 BPM | OXYGEN SATURATION: 97 %

## 2025-06-24 PROCEDURE — 99203 OFFICE O/P NEW LOW 30 MIN: CPT

## 2025-06-24 PROCEDURE — G2211 COMPLEX E/M VISIT ADD ON: CPT

## 2025-06-26 ENCOUNTER — TRANSCRIPTION ENCOUNTER (OUTPATIENT)
Age: 70
End: 2025-06-26

## 2025-07-08 ENCOUNTER — TRANSCRIPTION ENCOUNTER (OUTPATIENT)
Age: 70
End: 2025-07-08

## 2025-07-10 ENCOUNTER — TRANSCRIPTION ENCOUNTER (OUTPATIENT)
Age: 70
End: 2025-07-10

## 2025-07-17 ENCOUNTER — APPOINTMENT (OUTPATIENT)
Dept: INTERNAL MEDICINE | Facility: CLINIC | Age: 70
End: 2025-07-17
Payer: MEDICARE

## 2025-07-17 PROCEDURE — 93306 TTE W/DOPPLER COMPLETE: CPT

## 2025-07-18 ENCOUNTER — APPOINTMENT (OUTPATIENT)
Dept: PAIN MANAGEMENT | Facility: CLINIC | Age: 70
End: 2025-07-18
Payer: MEDICARE

## 2025-07-18 VITALS — WEIGHT: 234 LBS | BODY MASS INDEX: 31.69 KG/M2 | HEIGHT: 72 IN

## 2025-07-18 PROCEDURE — 99214 OFFICE O/P EST MOD 30 MIN: CPT | Mod: 25

## 2025-07-18 PROCEDURE — J3490M: CUSTOM | Mod: NC

## 2025-07-18 PROCEDURE — 20552 NJX 1/MLT TRIGGER POINT 1/2: CPT

## 2025-07-22 ENCOUNTER — RX RENEWAL (OUTPATIENT)
Age: 70
End: 2025-07-22

## 2025-07-25 ENCOUNTER — APPOINTMENT (OUTPATIENT)
Dept: CT IMAGING | Facility: CLINIC | Age: 70
End: 2025-07-25
Payer: MEDICARE

## 2025-07-25 ENCOUNTER — OUTPATIENT (OUTPATIENT)
Dept: OUTPATIENT SERVICES | Facility: HOSPITAL | Age: 70
LOS: 1 days | End: 2025-07-25
Payer: MEDICARE

## 2025-07-25 DIAGNOSIS — Q23.81 BICUSPID AORTIC VALVE: ICD-10-CM

## 2025-07-25 DIAGNOSIS — I45.10 UNSPECIFIED RIGHT BUNDLE-BRANCH BLOCK: ICD-10-CM

## 2025-07-25 DIAGNOSIS — Z98.890 OTHER SPECIFIED POSTPROCEDURAL STATES: Chronic | ICD-10-CM

## 2025-07-25 PROCEDURE — 75574 CT ANGIO HRT W/3D IMAGE: CPT | Mod: 26

## 2025-07-25 PROCEDURE — 75574 CT ANGIO HRT W/3D IMAGE: CPT

## 2025-07-28 ENCOUNTER — APPOINTMENT (OUTPATIENT)
Dept: PULMONOLOGY | Facility: CLINIC | Age: 70
End: 2025-07-28
Payer: MEDICARE

## 2025-07-28 VITALS — OXYGEN SATURATION: 97 % | DIASTOLIC BLOOD PRESSURE: 92 MMHG | HEART RATE: 56 BPM | SYSTOLIC BLOOD PRESSURE: 150 MMHG

## 2025-07-28 DIAGNOSIS — G47.33 OBSTRUCTIVE SLEEP APNEA (ADULT) (PEDIATRIC): ICD-10-CM

## 2025-07-28 PROCEDURE — G2211 COMPLEX E/M VISIT ADD ON: CPT

## 2025-07-28 PROCEDURE — 99213 OFFICE O/P EST LOW 20 MIN: CPT

## 2025-07-30 ENCOUNTER — APPOINTMENT (OUTPATIENT)
Age: 70
End: 2025-07-30
Payer: MEDICARE

## 2025-07-30 PROCEDURE — 64493 INJ PARAVERT F JNT L/S 1 LEV: CPT | Mod: RT

## 2025-07-30 PROCEDURE — 64494 INJ PARAVERT F JNT L/S 2 LEV: CPT | Mod: RT,59

## 2025-07-31 DIAGNOSIS — I25.10 ATHEROSCLEROTIC HEART DISEASE OF NATIVE CORONARY ARTERY W/OUT ANGINA PECTORIS: ICD-10-CM

## 2025-08-07 ENCOUNTER — APPOINTMENT (OUTPATIENT)
Dept: PULMONOLOGY | Facility: CLINIC | Age: 70
End: 2025-08-07

## 2025-08-15 ENCOUNTER — APPOINTMENT (OUTPATIENT)
Dept: PAIN MANAGEMENT | Facility: CLINIC | Age: 70
End: 2025-08-15
Payer: MEDICARE

## 2025-08-15 VITALS — HEIGHT: 72 IN | BODY MASS INDEX: 31.42 KG/M2 | WEIGHT: 232 LBS

## 2025-08-15 DIAGNOSIS — M54.16 RADICULOPATHY, LUMBAR REGION: ICD-10-CM

## 2025-08-15 DIAGNOSIS — M47.816 SPONDYLOSIS W/OUT MYELOPATHY OR RADICULOPATHY, LUMBAR REGION: ICD-10-CM

## 2025-08-15 PROCEDURE — 99214 OFFICE O/P EST MOD 30 MIN: CPT

## 2025-09-04 ENCOUNTER — APPOINTMENT (OUTPATIENT)
Dept: PAIN MANAGEMENT | Facility: CLINIC | Age: 70
End: 2025-09-04
Payer: MEDICARE

## 2025-09-04 DIAGNOSIS — M54.16 RADICULOPATHY, LUMBAR REGION: ICD-10-CM

## 2025-09-04 PROCEDURE — 62323 NJX INTERLAMINAR LMBR/SAC: CPT

## 2025-09-19 ENCOUNTER — APPOINTMENT (OUTPATIENT)
Dept: PAIN MANAGEMENT | Facility: CLINIC | Age: 70
End: 2025-09-19
Payer: MEDICARE

## 2025-09-19 VITALS — WEIGHT: 232 LBS | BODY MASS INDEX: 31.42 KG/M2 | HEIGHT: 72 IN

## 2025-09-19 DIAGNOSIS — M47.816 SPONDYLOSIS W/OUT MYELOPATHY OR RADICULOPATHY, LUMBAR REGION: ICD-10-CM

## 2025-09-19 DIAGNOSIS — M54.16 RADICULOPATHY, LUMBAR REGION: ICD-10-CM

## 2025-09-19 PROCEDURE — 99214 OFFICE O/P EST MOD 30 MIN: CPT

## (undated) DEVICE — Device

## (undated) DEVICE — VISITEC 4X4

## (undated) DEVICE — ELCTR GROUNDING PAD ADULT COVIDIEN

## (undated) DEVICE — FRAZIER SUCTION TIP 10FR

## (undated) DEVICE — POSITIONER CUSHION INSERT PRONE VIEW LG

## (undated) DEVICE — POSITIONER FOAM HEAD DONUT 9" (PINK)

## (undated) DEVICE — VENODYNE/SCD SLEEVE CALF MEDIUM

## (undated) DEVICE — WARMING BLANKET UPPER ADULT

## (undated) DEVICE — GLV 6.5 PROTEXIS (BLUE)

## (undated) DEVICE — SUT VICRYL 0 18" CT-1 UNDYED (POP-OFF)

## (undated) DEVICE — PACK POSTERIOR SPINAL FUSION

## (undated) DEVICE — GLV 7 PROTEXIS (BLUE)

## (undated) DEVICE — KIT ARRAY PULSE PATIENT REFERENCE

## (undated) DEVICE — SOL IRR POUR NS 0.9% 500ML

## (undated) DEVICE — PACK SPINE

## (undated) DEVICE — TUBING BIPOLAR IRRIGATOR AND CORD SET

## (undated) DEVICE — DRAIN JACKSON PRATT 10MM FLAT 3/4 NO TROCAR

## (undated) DEVICE — DRAPE SURGICAL #1010

## (undated) DEVICE — MIDAS REX LEGEND MATCH HEAD FLUTED LG BORE 3.0MM X 14CM

## (undated) DEVICE — TUBING SUCTION NONCONDUCTIVE 6MM X 12FT

## (undated) DEVICE — MISONIX BONESCALPEL IRRIGATION TUBE SET

## (undated) DEVICE — POSITIONER JACKSON TABLE HEADREST 7", CHEST, HIP, THIGH PADS, ARM CRADLE

## (undated) DEVICE — DRAPE 3/4 SHEET W REINFORCEMENT 56X77"

## (undated) DEVICE — ELCTR AQUAMANTYS BIPOLAR SEALER 6.0

## (undated) DEVICE — BLADE MILL FINE STRL DISP

## (undated) DEVICE — FOLEY TRAY 16FR 5CC LTX UMETER CLOSED

## (undated) DEVICE — SUT SILK 2-0 30" SH

## (undated) DEVICE — KIT PREP BONE MILL

## (undated) DEVICE — DRAIN RESERVOIR FOR JACKSON PRATT 100CC CARDINAL

## (undated) DEVICE — MIDAS REX MR7 LUBRICANT DIFFUSER CARTRIDGE

## (undated) DEVICE — POSITIONER PATIENT SAFETY STRAP 3X60"

## (undated) DEVICE — GLV 7 PROTEXIS (WHITE)

## (undated) DEVICE — SOL INJ NS 0.9% 1000ML

## (undated) DEVICE — SOL IRR POUR H2O 1000ML

## (undated) DEVICE — TAP NAVS RELINE CONSOL 3.75-4.5MM

## (undated) DEVICE — DRSG 4 X 4" 4PLY STERILE

## (undated) DEVICE — SOL IRR POUR NS 0.9% 1000ML

## (undated) DEVICE — DRAPE C ARM 41X140"

## (undated) DEVICE — SUT VICRYL 2-0 18" CP-2 UNDYED (POP-OFF)

## (undated) DEVICE — DRSG KERLIX ROLL 4.5"

## (undated) DEVICE — POSITIONER FOAM LAMINECTOMY ARM CRADLE (PINK)

## (undated) DEVICE — SUT ETHILON 3-0 18" PS-1

## (undated) DEVICE — MISONIX BONESCALPEL BLUNT BLADE & TUBESET 20MM

## (undated) DEVICE — DRAPE INSTRUMENT POUCH 6.75" X 11"

## (undated) DEVICE — DRSG XEROFORM 5 X 9"

## (undated) DEVICE — DRAPE SHOWER CURTAIN ISOLATION

## (undated) DEVICE — ELCTR BOVIE TIP BLADE INSULATED 2.75" EDGE WITH SAFETY

## (undated) DEVICE — SUT ETHILON 2-0 18" FS

## (undated) DEVICE — GLV 6.5 PROTEXIS (WHITE)

## (undated) DEVICE — GOWN IMPERV BREATHABLE XL

## (undated) DEVICE — SUT VICRYL 1 18" CT-1 UNDYED (POP-OFF)

## (undated) DEVICE — FRA-ESU BOVIE FORCE TRIAD T6D04548DX: Type: DURABLE MEDICAL EQUIPMENT

## (undated) DEVICE — PACK BASIC

## (undated) DEVICE — BIPOLAR FORCEP HENSLER BAYONET 10" X 1MM (YELLOW)

## (undated) DEVICE — DRAPE TOWEL BLUE 17" X 24"

## (undated) DEVICE — FRAZIER SUCTION TIP 12FR

## (undated) DEVICE — STAPLER SKIN VISI-STAT 35 WIDE

## (undated) DEVICE — PREP DURAPREP 6CC

## (undated) DEVICE — ELCTR STRYKER NEPTUNE SMOKE EVACUATION PENCIL (GREEN)